# Patient Record
Sex: FEMALE | Race: WHITE | NOT HISPANIC OR LATINO | Employment: OTHER | URBAN - METROPOLITAN AREA
[De-identification: names, ages, dates, MRNs, and addresses within clinical notes are randomized per-mention and may not be internally consistent; named-entity substitution may affect disease eponyms.]

---

## 2017-08-15 ENCOUNTER — OFFICE VISIT (OUTPATIENT)
Dept: ORTHOPEDIC SURGERY | Facility: CLINIC | Age: 52
End: 2017-08-15

## 2017-08-15 VITALS — WEIGHT: 221 LBS | BODY MASS INDEX: 34.69 KG/M2 | HEIGHT: 67 IN | TEMPERATURE: 97.6 F

## 2017-08-15 DIAGNOSIS — M51.36 LUMBAR DEGENERATIVE DISC DISEASE: ICD-10-CM

## 2017-08-15 DIAGNOSIS — M22.40 CHONDROMALACIA PATELLAE SYNDROME, UNSPECIFIED LATERALITY: ICD-10-CM

## 2017-08-15 DIAGNOSIS — M25.561 CHRONIC PAIN OF BOTH KNEES: Primary | ICD-10-CM

## 2017-08-15 DIAGNOSIS — G89.29 CHRONIC PAIN OF BOTH KNEES: Primary | ICD-10-CM

## 2017-08-15 DIAGNOSIS — M25.562 CHRONIC PAIN OF BOTH KNEES: Primary | ICD-10-CM

## 2017-08-15 PROCEDURE — 73562 X-RAY EXAM OF KNEE 3: CPT | Performed by: ORTHOPAEDIC SURGERY

## 2017-08-15 PROCEDURE — 20610 DRAIN/INJ JOINT/BURSA W/O US: CPT | Performed by: ORTHOPAEDIC SURGERY

## 2017-08-15 PROCEDURE — 99203 OFFICE O/P NEW LOW 30 MIN: CPT | Performed by: ORTHOPAEDIC SURGERY

## 2017-08-15 RX ORDER — TRAZODONE HYDROCHLORIDE 100 MG/1
200 TABLET ORAL AS NEEDED
COMMUNITY
Start: 2015-02-19

## 2017-08-15 RX ORDER — ATORVASTATIN CALCIUM 40 MG/1
40 TABLET, FILM COATED ORAL DAILY
COMMUNITY
Start: 2017-04-01

## 2017-08-15 RX ORDER — SUMATRIPTAN 100 MG/1
100 TABLET, FILM COATED ORAL AS NEEDED
COMMUNITY
Start: 2015-08-25

## 2017-08-15 RX ORDER — LISINOPRIL 20 MG/1
20 TABLET ORAL DAILY
COMMUNITY
Start: 2016-03-21

## 2017-08-15 RX ORDER — BUPIVACAINE HYDROCHLORIDE 5 MG/ML
2 INJECTION, SOLUTION PERINEURAL
Status: COMPLETED | OUTPATIENT
Start: 2017-08-15 | End: 2017-08-15

## 2017-08-15 RX ORDER — FLUTICASONE PROPIONATE 50 MCG
2 SPRAY, SUSPENSION (ML) NASAL AS NEEDED
COMMUNITY
Start: 2016-12-21

## 2017-08-15 RX ORDER — LIDOCAINE HYDROCHLORIDE 10 MG/ML
2 INJECTION, SOLUTION INFILTRATION; PERINEURAL
Status: COMPLETED | OUTPATIENT
Start: 2017-08-15 | End: 2017-08-15

## 2017-08-15 RX ORDER — CYCLOBENZAPRINE HCL 10 MG
10 TABLET ORAL 3 TIMES DAILY PRN
COMMUNITY
Start: 2015-02-17

## 2017-08-15 RX ORDER — METHYLPREDNISOLONE ACETATE 80 MG/ML
80 INJECTION, SUSPENSION INTRA-ARTICULAR; INTRALESIONAL; INTRAMUSCULAR; SOFT TISSUE
Status: COMPLETED | OUTPATIENT
Start: 2017-08-15 | End: 2017-08-15

## 2017-08-15 RX ORDER — OXYCODONE AND ACETAMINOPHEN 10; 325 MG/1; MG/1
1 TABLET ORAL EVERY 4 HOURS PRN
COMMUNITY
Start: 2016-11-13 | End: 2018-05-21 | Stop reason: HOSPADM

## 2017-08-15 RX ORDER — ALBUTEROL SULFATE 90 UG/1
1-2 AEROSOL, METERED RESPIRATORY (INHALATION) EVERY 4 HOURS PRN
COMMUNITY
Start: 2014-04-14

## 2017-08-15 RX ORDER — CETIRIZINE HYDROCHLORIDE 10 MG/1
10 TABLET ORAL DAILY
COMMUNITY
Start: 2012-09-26

## 2017-08-15 RX ORDER — POTASSIUM CHLORIDE 750 MG/1
10 TABLET, EXTENDED RELEASE ORAL 2 TIMES DAILY
COMMUNITY
Start: 2015-03-03

## 2017-08-15 RX ORDER — LEVOTHYROXINE SODIUM 0.07 MG/1
75 TABLET ORAL DAILY
COMMUNITY
Start: 2017-08-03

## 2017-08-15 RX ORDER — CLONAZEPAM 1 MG/1
1 TABLET ORAL 2 TIMES DAILY PRN
COMMUNITY
Start: 2015-06-18

## 2017-08-15 RX ORDER — ESTRADIOL 1 MG/1
TABLET ORAL
COMMUNITY
Start: 2017-07-24 | End: 2018-05-16

## 2017-08-15 RX ORDER — FUROSEMIDE 20 MG/1
TABLET ORAL
COMMUNITY
Start: 2017-07-31 | End: 2018-05-16

## 2017-08-15 RX ORDER — OMEPRAZOLE 40 MG/1
40 CAPSULE, DELAYED RELEASE ORAL DAILY
COMMUNITY
Start: 2016-09-21

## 2017-08-15 RX ADMIN — METHYLPREDNISOLONE ACETATE 80 MG: 80 INJECTION, SUSPENSION INTRA-ARTICULAR; INTRALESIONAL; INTRAMUSCULAR; SOFT TISSUE at 15:22

## 2017-08-15 RX ADMIN — BUPIVACAINE HYDROCHLORIDE 2 ML: 5 INJECTION, SOLUTION PERINEURAL at 15:22

## 2017-08-15 RX ADMIN — LIDOCAINE HYDROCHLORIDE 2 ML: 10 INJECTION, SOLUTION INFILTRATION; PERINEURAL at 15:22

## 2017-08-15 NOTE — PROGRESS NOTES
"Patient Name: No Lambert   YOB: 1965  Referring Primary Care Physician: Molly Hood MD      Chief Complaint:    Chief Complaint   Patient presents with   • Left Knee - Establish Care   • Right Knee - Establish Care        Subjective:    HPI:   No Lambert is a pleasant 52 y.o. year old who presents today for evaluation of   Chief Complaint   Patient presents with   • Left Knee - Establish Care   • Right Knee - Establish Care   chronic vinnie knee pain achy, atraumatic and worse over last 6 months.  Saw dr pomeroys assist who injected with cortisone and visco and didn't help.  Says they were talking about a \"knee replacement\".. Constant.  A  CANT TAKE NSAIDS.  NOTHING HELPS.  NO pt    This problem is new to this examiner.     Medications:   Home Medications:  No current outpatient prescriptions on file prior to visit.     No current facility-administered medications on file prior to visit.      Current Medications:  Scheduled Meds:  Continuous Infusions:  No current facility-administered medications for this visit.   PRN Meds:.    I have reviewed the patient's medical history in detail and updated the computerized patient record.  Review and summarization of old records includes:    Past Medical History:   Diagnosis Date   • Acid reflux    • Anxiety    • Borderline diabetes    • Fracture, tibia and fibula    • Hypothyroidism    • Irregular heartbeat    • Migraines    • Stress fracture         Past Surgical History:   Procedure Laterality Date   • BACK SURGERY     • EXPLORATORY LAPAROTOMY     • HYSTERECTOMY     • NECK SURGERY     • TONSILLECTOMY     • TUBAL ABDOMINAL LIGATION          Social History     Occupational History   • Not on file.     Social History Main Topics   • Smoking status: Never Smoker   • Smokeless tobacco: Not on file   • Alcohol use No   • Drug use: No   • Sexual activity: Defer    Social History     Social History Narrative   • No narrative on file        Family History "   Problem Relation Age of Onset   • Hypertension Mother    • Heart failure Father    • Hypertension Sister    • Hypertension Brother        ROS: 14 point review of systems was performed and all other systems were reviewed and are negative except for documented findings in HPI and today's encounter.     Allergies:   Allergies   Allergen Reactions   • Ibuprofen Swelling   • Naproxen Swelling   • Oxycodone-Acetaminophen Rash   • Sulfa Antibiotics Swelling   • Cephalosporins      Pruritic rash   • Ciprofloxacin      Reaction at hospital reported by patient     Constitutional:  Denies fever, shaking or chills   Eyes:  Denies change in visual acuity   HENT:  Denies nasal congestion or sore throat   Respiratory:  Denies cough or shortness of breath   Cardiovascular:  Denies chest pain or severe LE edema   GI:  Denies abdominal pain, nausea, vomiting, bloody stools or diarrhea   Musculoskeletal:  Numbness, tingling, pain, or loss of motor function only as noted above in history of present illness.  : Denies painful urination or hematuria  Integument:  Denies rash, lesion or ulceration   Neurologic:  Denies headache or focal weakness  Endocrine:  Denies lymphadenopathy  Psych:  Denies confusion or change in mental status   Hem:  Denies active bleeding    Objective:    Physical Exam: 52 y.o. female  Body mass index is 34.61 kg/(m^2)., @WT@, @HT@  Vitals:    08/15/17 1437   Temp: 97.6 °F (36.4 °C)     Vital signs reviewed.   General Appearance:    Alert, cooperative, in no acute distress                  Eyes: conjunctiva clear  ENT: external ears and nose atraumatic  CV: no peripheral edema  Resp: normal respiratory effort  Skin: no rashes or wounds; normal turgor  Psych: mood and affect appropriate  Lymph: no nodes appreciated  Neuro: gross sensation intact  Vascular:  Palpable peripheral pulse in noted extremity  Musculoskeletal Extremities: KNEE Exam: medial joint line tenderness with crepitation, synovitis, swelling,  and joint effusion bilateral knee. BAKERS CYST.  PF CREP.  FOOT DROM ON THE LEFT    Radiology:   Imaging done today and discussed at length with the patient:    Indication: pain related symptoms,  Views: 3V AP, LAT & 40 degree PA bilateral knee(s)   Findings: MOD OA AND CHONDROMALACIA PATELLA BILATERAL  Comparison views: not available  Reviewed records from dr mata and mris of both knees- see scanned reports    Assessment:     ICD-10-CM ICD-9-CM   1. Chronic pain of both knees M25.561 719.46    M25.562 338.29    G89.29    2. Chondromalacia patellae syndrome, unspecified laterality M22.40 717.7   3. Lumbar degenerative disc disease M51.36 722.52        Large Joint Arthrocentesis  Date/Time: 8/15/2017 3:22 PM  Consent given by: patient  Site marked: site marked  Timeout: Immediately prior to procedure a time out was called to verify the correct patient, procedure, equipment, support staff and site/side marked as required   Supporting Documentation  Indications: pain and joint swelling   Procedure Details  Location: knee - R knee  Preparation: Patient was prepped and draped in the usual sterile fashion  Needle size: 22 G  Approach: anterolateral  Medications administered: 2 mL lidocaine 1 %; 80 mg methylPREDNISolone acetate 80 MG/ML; 2 mL bupivacaine  Patient tolerance: patient tolerated the procedure well with no immediate complications    Large Joint Arthrocentesis  Date/Time: 8/15/2017 3:22 PM  Consent given by: patient  Site marked: site marked  Timeout: Immediately prior to procedure a time out was called to verify the correct patient, procedure, equipment, support staff and site/side marked as required   Supporting Documentation  Indications: pain and joint swelling   Procedure Details  Location: knee - L knee  Preparation: Patient was prepped and draped in the usual sterile fashion  Needle size: 22 G  Approach: anterolateral  Medications administered: 2 mL lidocaine 1 %; 80 mg methylPREDNISolone acetate 80  MG/ML; 2 mL bupivacaine  Patient tolerance: patient tolerated the procedure well with no immediate complications             Plan: Biomechanics of pertinent body area discussed.  Risks, benefits, alternatives, comparisons, and complications of accepted medicines, injections, recommendations, surgical procedures, and therapies explained and education provided in laymen's terms. The patient was given the opportunity to ask questions and they were answerved to their satisfaction.   Natural history and expected course of this patient's diagnosis discussed along with evaluation of therapies. Questions answered.  Advice on benefits of, and types of regular/moderate exercise.  Address and update of wt loss, physical exercises, use of assistive devices and medications along with potential or observed side effects.  Lifestyle measures for weight loss with biomechanical explanations for weight loss and how this affects orthopedic condition.  Cortisone Injection for DIAGNOSTIC and THERAPUTIC purposes.  PT referral.  Reviewed medical records from other provider(s).  Compression/elastic brace/support sleeve.   Rest, ice, compression, and elevation (RICE) therapy.  Biomechanics and proper use of cane/ambulatory aids.      8/15/2017

## 2018-05-15 ENCOUNTER — APPOINTMENT (OUTPATIENT)
Dept: PREADMISSION TESTING | Facility: HOSPITAL | Age: 53
End: 2018-05-15

## 2018-05-16 ENCOUNTER — APPOINTMENT (OUTPATIENT)
Dept: PREADMISSION TESTING | Facility: HOSPITAL | Age: 53
End: 2018-05-16

## 2018-05-16 VITALS
TEMPERATURE: 97.2 F | OXYGEN SATURATION: 96 % | HEIGHT: 67 IN | RESPIRATION RATE: 20 BRPM | SYSTOLIC BLOOD PRESSURE: 128 MMHG | BODY MASS INDEX: 36.73 KG/M2 | DIASTOLIC BLOOD PRESSURE: 92 MMHG | HEART RATE: 91 BPM | WEIGHT: 234 LBS

## 2018-05-16 LAB
ANION GAP SERPL CALCULATED.3IONS-SCNC: 13.7 MMOL/L
BUN BLD-MCNC: 15 MG/DL (ref 6–20)
BUN/CREAT SERPL: 15.5 (ref 7–25)
CALCIUM SPEC-SCNC: 8.9 MG/DL (ref 8.6–10.5)
CHLORIDE SERPL-SCNC: 100 MMOL/L (ref 98–107)
CO2 SERPL-SCNC: 25.3 MMOL/L (ref 22–29)
CREAT BLD-MCNC: 0.97 MG/DL (ref 0.57–1)
DEPRECATED RDW RBC AUTO: 50.9 FL (ref 37–54)
ERYTHROCYTE [DISTWIDTH] IN BLOOD BY AUTOMATED COUNT: 14.3 % (ref 11.7–13)
GFR SERPL CREATININE-BSD FRML MDRD: 60 ML/MIN/1.73
GLUCOSE BLD-MCNC: 131 MG/DL (ref 65–99)
HCT VFR BLD AUTO: 40.7 % (ref 35.6–45.5)
HGB BLD-MCNC: 13.1 G/DL (ref 11.9–15.5)
MCH RBC QN AUTO: 31.1 PG (ref 26.9–32)
MCHC RBC AUTO-ENTMCNC: 32.2 G/DL (ref 32.4–36.3)
MCV RBC AUTO: 96.7 FL (ref 80.5–98.2)
PLATELET # BLD AUTO: 235 10*3/MM3 (ref 140–500)
PMV BLD AUTO: 10.2 FL (ref 6–12)
POTASSIUM BLD-SCNC: 4 MMOL/L (ref 3.5–5.2)
RBC # BLD AUTO: 4.21 10*6/MM3 (ref 3.9–5.2)
SODIUM BLD-SCNC: 139 MMOL/L (ref 136–145)
WBC NRBC COR # BLD: 7.28 10*3/MM3 (ref 4.5–10.7)

## 2018-05-16 PROCEDURE — 36415 COLL VENOUS BLD VENIPUNCTURE: CPT

## 2018-05-16 PROCEDURE — 80048 BASIC METABOLIC PNL TOTAL CA: CPT | Performed by: PAIN MEDICINE

## 2018-05-16 PROCEDURE — 85027 COMPLETE CBC AUTOMATED: CPT | Performed by: PAIN MEDICINE

## 2018-05-16 RX ORDER — FUROSEMIDE 20 MG/1
20 TABLET ORAL 2 TIMES DAILY
COMMUNITY

## 2018-05-16 RX ORDER — ESTRADIOL 1 MG/1
1 TABLET ORAL DAILY
COMMUNITY

## 2018-05-16 NOTE — DISCHARGE INSTRUCTIONS
Take the following medications the morning of surgery with a small sip of water:  ALBUTEROL, LEVOTHYROXINE, PERCOCET, CLONAZEPAM    ARRIVE TO MAIN OR AT 1:30 P.M.    General Instructions:  • Do not eat solid food after midnight the night before surgery.  • You may drink clear liquids day of surgery but must stop at least one hour before your hospital arrival time.  • It is beneficial for you to have a clear drink that contains carbohydrates the day of surgery.  We suggest a 12 to 20 ounce bottle of Gatorade or Powerade for non-diabetic patients or a 12 to 20 ounce bottle of G2 or Powerade Zero for diabetic patients. (Pediatric patients, are not advised to drink a 12 to 20 ounce carbohydrate drink)    Clear liquids are liquids you can see through.  Nothing red in color.     Plain water                               Sports drinks  Sodas                                   Gelatin (Jell-O)  Fruit juices without pulp such as white grape juice and apple juice  Popsicles that contain no fruit or yogurt  Tea or coffee (no cream or milk added)  Gatorade / Powerade  G2 / Powerade Zero    • Infants may have breast milk up to four hours before surgery.  • Infants drinking formula may drink formula up to six hours before surgery.   • Patients who avoid smoking, chewing tobacco and alcohol for 4 weeks prior to surgery have a reduced risk of post-operative complications.  Quit smoking as many days before surgery as you can.  • Do not smoke, use chewing tobacco or drink alcohol the day of surgery.   • If applicable bring your C-PAP/ BI-PAP machine.  • Bring any papers given to you in the doctor’s office.  • Wear clean comfortable clothes and socks.  • Do not wear contact lenses or make-up.  Bring a case for your glasses.   • Bring crutches or walker if applicable.  • Remove all piercings.  Leave jewelry and any other valuables at home.  • Hair extensions with metal clips must be removed prior to surgery.  • The Pre-Admission Testing  nurse will instruct you to bring medications if unable to obtain an accurate list in Pre-Admission Testing.          Preventing a Surgical Site Infection:  • For 2 to 3 days before surgery, avoid shaving with a razor because the razor can irritate skin and make it easier to develop an infection.  • The night prior to surgery sleep in a clean bed with clean clothing.  Do not allow pets to sleep with you.  • Shower on the morning of surgery using a fresh bar of anti-bacterial soap (such as Dial) and clean washcloth.  Dry with a clean towel and dress in clean clothing.  • Ask your surgeon if you will be receiving antibiotics prior to surgery.  • Make sure you, your family, and all healthcare providers clean their hands with soap and water or an alcohol based hand  before caring for you or your wound.    Day of surgery:  Upon arrival, a Pre-op nurse and Anesthesiologist will review your health history, obtain vital signs, and answer questions you may have.  The only belongings needed at this time will be your home medications and if applicable your C-PAP/BI-PAP machine.  If you are staying overnight your family can leave the rest of your belongings in the car and bring them to your room later.  A Pre-op nurse will start an IV and you may receive medication in preparation for surgery, including something to help you relax.  Your family will be able to see you in the Pre-op area.  While you are in surgery your family should notify the waiting room  if they leave the waiting room area and provide a contact phone number.    Please be aware that surgery does come with discomfort.  We want to make every effort to control your discomfort so please discuss any uncontrolled symptoms with your nurse.   Your doctor will most likely have prescribed pain medications.      If you are going home after surgery you will receive individualized written care instructions before being discharged.  A responsible adult  must drive you to and from the hospital on the day of your surgery and stay with you for 24 hours.    If you are staying overnight following surgery, you will be transported to your hospital room following the recovery period.  [facility] has all private rooms.    If you have any questions please call Pre-Admission Testing at [phone].  Deductibles and co-payments are collected on the day of service. Please be prepared to pay the required co-pay, deductible or deposit on the day of service as defined by your plan.

## 2018-05-21 ENCOUNTER — ANESTHESIA EVENT (OUTPATIENT)
Dept: PERIOP | Facility: HOSPITAL | Age: 53
End: 2018-05-21

## 2018-05-21 ENCOUNTER — ANESTHESIA (OUTPATIENT)
Dept: PERIOP | Facility: HOSPITAL | Age: 53
End: 2018-05-21

## 2018-05-21 ENCOUNTER — APPOINTMENT (OUTPATIENT)
Dept: GENERAL RADIOLOGY | Facility: HOSPITAL | Age: 53
End: 2018-05-21

## 2018-05-21 ENCOUNTER — HOSPITAL ENCOUNTER (OUTPATIENT)
Facility: HOSPITAL | Age: 53
Setting detail: HOSPITAL OUTPATIENT SURGERY
Discharge: HOME | End: 2018-05-21
Attending: PAIN MEDICINE | Admitting: PAIN MEDICINE

## 2018-05-21 VITALS
OXYGEN SATURATION: 96 % | WEIGHT: 235.01 LBS | HEART RATE: 84 BPM | SYSTOLIC BLOOD PRESSURE: 117 MMHG | BODY MASS INDEX: 36.81 KG/M2 | DIASTOLIC BLOOD PRESSURE: 80 MMHG | TEMPERATURE: 99 F | RESPIRATION RATE: 16 BRPM

## 2018-05-21 PROBLEM — M96.1 POSTLAMINECTOMY SYNDROME OF LUMBOSACRAL REGION: Chronic | Status: ACTIVE | Noted: 2018-05-21

## 2018-05-21 LAB — GLUCOSE BLDC GLUCOMTR-MCNC: 102 MG/DL (ref 70–130)

## 2018-05-21 PROCEDURE — 82962 GLUCOSE BLOOD TEST: CPT

## 2018-05-21 PROCEDURE — 25010000002 PROPOFOL 10 MG/ML EMULSION: Performed by: NURSE ANESTHETIST, CERTIFIED REGISTERED

## 2018-05-21 PROCEDURE — C1772 INFUSION PUMP, PROGRAMMABLE: HCPCS | Performed by: PAIN MEDICINE

## 2018-05-21 PROCEDURE — 25010000002 MIDAZOLAM PER 1 MG: Performed by: ANESTHESIOLOGY

## 2018-05-21 PROCEDURE — 25010000002 FENTANYL CITRATE (PF) 100 MCG/2ML SOLUTION: Performed by: NURSE ANESTHETIST, CERTIFIED REGISTERED

## 2018-05-21 PROCEDURE — 72020 X-RAY EXAM OF SPINE 1 VIEW: CPT

## 2018-05-21 PROCEDURE — 25010000002 VANCOMYCIN PER 500 MG: Performed by: PAIN MEDICINE

## 2018-05-21 PROCEDURE — 25010000002 FENTANYL CITRATE (PF) 100 MCG/2ML SOLUTION: Performed by: ANESTHESIOLOGY

## 2018-05-21 PROCEDURE — 25010000002 HYDROMORPHONE PER 4 MG: Performed by: PAIN MEDICINE

## 2018-05-21 PROCEDURE — 25010000002 ONDANSETRON PER 1 MG: Performed by: NURSE ANESTHETIST, CERTIFIED REGISTERED

## 2018-05-21 PROCEDURE — C1755 CATHETER, INTRASPINAL: HCPCS | Performed by: PAIN MEDICINE

## 2018-05-21 PROCEDURE — 76000 FLUOROSCOPY <1 HR PHYS/QHP: CPT

## 2018-05-21 PROCEDURE — 25010000002 DEXAMETHASONE PER 1 MG: Performed by: NURSE ANESTHETIST, CERTIFIED REGISTERED

## 2018-05-21 DEVICE — THE INTRATHECAL CATHETER IS A STERILE, SINGLE-PIECE, RADIOPAQUE, SILICONE CATHETER WITH A PRE-INSERTED HYDROPHILIC STIFFENING STYLET THAT IS USED TO ASSIST IN PLACING THE CATHETER.
Type: IMPLANTABLE DEVICE | Site: BACK | Status: FUNCTIONAL
Brand: PROMETRA INTRATHECAL CATHETER

## 2018-05-21 DEVICE — THE PROMETRA II PROGRAMMABLE PUMP IS A STERILE, BATTERY-OPERATED, TEARDROP-SHAPED IMPLANTABLE, PROGRAMMABLE INFUSION PUMP, WITH A RIGID TITANIUM HOUSING AND FLOW CONTROLLER SYSTEM, WHICH DISPENSES INFUSATE INTO THE INTRATHECAL SPACE THROUGH AN IMPLANTED INTRATHECAL CATHETER. TO HELP INCREASE SAFETY, THE PROMETRA II PUMP INCORPORATES A SAFETY VALVE (FLOW-ACTIVATED VALVE OR FAV) THAT WILL SHUT OFF DRUG FLOW TO THE PATIENT IN THE EVENT A HIGH FLOW RATE OCCURS, SUCH AS DURING AN MRI. ALL FUNCTIONS OF THE SYSTEM (E.G., DOSING) ARE CONTROLLED EXTERNALLY USING A HAND-HELD, BATTERY-OPERATED PROGRAMMER. THE PROMETRA II PUMP CONTAINS A METAL BELLOWS DRUG RESERVOIR WITH A CAPACITY OF 20 MILLILITERS (ML). THE RESERVOIR PROPELLANT IS STORED WITHIN THE RIGID HOUSING SURROUNDING THE BELLOWS AND PROVIDES THE DRIVING PRESSURE FOR THE PUMP. THE DRIVING PRESSURE ON THE RESERVOIR FORCES THE INFUSATE THROUGH AN OUTLET FILTER (0.22 PM), AND INTO AN ELECTRONICALLY CONTROLLED FLOW METERING VALVE-ACCUMULATOR SUBSYSTEM. THE INFUMORPH PASSES FROM THE FLOW METERING SUBSYSTEM, INTO THE CATHETER ACCESS PORT THEN INTO THE CATHETER FOR DELIVERY TO THE INTRATHECAL SPACE.
Type: IMPLANTABLE DEVICE | Site: BACK | Status: FUNCTIONAL
Brand: PROMETRA II PUMP

## 2018-05-21 RX ORDER — ONDANSETRON 2 MG/ML
INJECTION INTRAMUSCULAR; INTRAVENOUS AS NEEDED
Status: DISCONTINUED | OUTPATIENT
Start: 2018-05-21 | End: 2018-05-21 | Stop reason: SURG

## 2018-05-21 RX ORDER — MIDAZOLAM HYDROCHLORIDE 1 MG/ML
1 INJECTION INTRAMUSCULAR; INTRAVENOUS
Status: DISCONTINUED | OUTPATIENT
Start: 2018-05-21 | End: 2018-05-21 | Stop reason: HOSPADM

## 2018-05-21 RX ORDER — OXYCODONE AND ACETAMINOPHEN 7.5; 325 MG/1; MG/1
1 TABLET ORAL ONCE AS NEEDED
Status: DISCONTINUED | OUTPATIENT
Start: 2018-05-21 | End: 2018-05-21 | Stop reason: HOSPADM

## 2018-05-21 RX ORDER — LIDOCAINE HYDROCHLORIDE 20 MG/ML
INJECTION, SOLUTION INFILTRATION; PERINEURAL AS NEEDED
Status: DISCONTINUED | OUTPATIENT
Start: 2018-05-21 | End: 2018-05-21 | Stop reason: SURG

## 2018-05-21 RX ORDER — CLINDAMYCIN PHOSPHATE 600 MG/50ML
600 INJECTION INTRAVENOUS
Status: COMPLETED | OUTPATIENT
Start: 2018-05-21 | End: 2018-05-21

## 2018-05-21 RX ORDER — MIDAZOLAM HYDROCHLORIDE 1 MG/ML
2 INJECTION INTRAMUSCULAR; INTRAVENOUS
Status: DISCONTINUED | OUTPATIENT
Start: 2018-05-21 | End: 2018-05-21 | Stop reason: HOSPADM

## 2018-05-21 RX ORDER — PROMETHAZINE HYDROCHLORIDE 25 MG/ML
12.5 INJECTION, SOLUTION INTRAMUSCULAR; INTRAVENOUS ONCE AS NEEDED
Status: DISCONTINUED | OUTPATIENT
Start: 2018-05-21 | End: 2018-05-21 | Stop reason: HOSPADM

## 2018-05-21 RX ORDER — LABETALOL HYDROCHLORIDE 5 MG/ML
5 INJECTION, SOLUTION INTRAVENOUS
Status: DISCONTINUED | OUTPATIENT
Start: 2018-05-21 | End: 2018-05-21 | Stop reason: HOSPADM

## 2018-05-21 RX ORDER — SODIUM CHLORIDE 0.9 % (FLUSH) 0.9 %
1-10 SYRINGE (ML) INJECTION AS NEEDED
Status: DISCONTINUED | OUTPATIENT
Start: 2018-05-21 | End: 2018-05-21 | Stop reason: HOSPADM

## 2018-05-21 RX ORDER — DIPHENHYDRAMINE HYDROCHLORIDE 50 MG/ML
12.5 INJECTION INTRAMUSCULAR; INTRAVENOUS
Status: DISCONTINUED | OUTPATIENT
Start: 2018-05-21 | End: 2018-05-21 | Stop reason: HOSPADM

## 2018-05-21 RX ORDER — ROCURONIUM BROMIDE 10 MG/ML
INJECTION, SOLUTION INTRAVENOUS AS NEEDED
Status: DISCONTINUED | OUTPATIENT
Start: 2018-05-21 | End: 2018-05-21 | Stop reason: SURG

## 2018-05-21 RX ORDER — ONDANSETRON 2 MG/ML
4 INJECTION INTRAMUSCULAR; INTRAVENOUS ONCE AS NEEDED
Status: DISCONTINUED | OUTPATIENT
Start: 2018-05-21 | End: 2018-05-21 | Stop reason: HOSPADM

## 2018-05-21 RX ORDER — NALOXONE HCL 0.4 MG/ML
0.2 VIAL (ML) INJECTION AS NEEDED
Status: DISCONTINUED | OUTPATIENT
Start: 2018-05-21 | End: 2018-05-21 | Stop reason: HOSPADM

## 2018-05-21 RX ORDER — SODIUM CHLORIDE 9 MG/ML
INJECTION, SOLUTION INTRAVENOUS AS NEEDED
Status: DISCONTINUED | OUTPATIENT
Start: 2018-05-21 | End: 2018-05-21 | Stop reason: HOSPADM

## 2018-05-21 RX ORDER — EPHEDRINE SULFATE 50 MG/ML
5 INJECTION, SOLUTION INTRAVENOUS ONCE AS NEEDED
Status: DISCONTINUED | OUTPATIENT
Start: 2018-05-21 | End: 2018-05-21 | Stop reason: HOSPADM

## 2018-05-21 RX ORDER — DEXAMETHASONE SODIUM PHOSPHATE 10 MG/ML
INJECTION INTRAMUSCULAR; INTRAVENOUS AS NEEDED
Status: DISCONTINUED | OUTPATIENT
Start: 2018-05-21 | End: 2018-05-21 | Stop reason: SURG

## 2018-05-21 RX ORDER — FAMOTIDINE 10 MG/ML
20 INJECTION, SOLUTION INTRAVENOUS ONCE
Status: COMPLETED | OUTPATIENT
Start: 2018-05-21 | End: 2018-05-21

## 2018-05-21 RX ORDER — PROPOFOL 10 MG/ML
VIAL (ML) INTRAVENOUS AS NEEDED
Status: DISCONTINUED | OUTPATIENT
Start: 2018-05-21 | End: 2018-05-21 | Stop reason: SURG

## 2018-05-21 RX ORDER — FENTANYL CITRATE 50 UG/ML
50 INJECTION, SOLUTION INTRAMUSCULAR; INTRAVENOUS
Status: DISCONTINUED | OUTPATIENT
Start: 2018-05-21 | End: 2018-05-21 | Stop reason: HOSPADM

## 2018-05-21 RX ORDER — OXYCODONE HYDROCHLORIDE AND ACETAMINOPHEN 5; 325 MG/1; MG/1
1 TABLET ORAL ONCE AS NEEDED
Status: DISCONTINUED | OUTPATIENT
Start: 2018-05-21 | End: 2018-05-21 | Stop reason: HOSPADM

## 2018-05-21 RX ORDER — HYDROMORPHONE HYDROCHLORIDE 1 MG/ML
0.5 INJECTION, SOLUTION INTRAMUSCULAR; INTRAVENOUS; SUBCUTANEOUS
Status: DISCONTINUED | OUTPATIENT
Start: 2018-05-21 | End: 2018-05-21 | Stop reason: HOSPADM

## 2018-05-21 RX ORDER — PROMETHAZINE HYDROCHLORIDE 25 MG/1
25 TABLET ORAL ONCE AS NEEDED
Status: DISCONTINUED | OUTPATIENT
Start: 2018-05-21 | End: 2018-05-21 | Stop reason: HOSPADM

## 2018-05-21 RX ORDER — HYDROMORPHONE HCL 110MG/55ML
PATIENT CONTROLLED ANALGESIA SYRINGE INTRAVENOUS AS NEEDED
Status: DISCONTINUED | OUTPATIENT
Start: 2018-05-21 | End: 2018-05-21 | Stop reason: HOSPADM

## 2018-05-21 RX ORDER — FLUMAZENIL 0.1 MG/ML
0.2 INJECTION INTRAVENOUS AS NEEDED
Status: DISCONTINUED | OUTPATIENT
Start: 2018-05-21 | End: 2018-05-21 | Stop reason: HOSPADM

## 2018-05-21 RX ORDER — BUPIVACAINE HYDROCHLORIDE AND EPINEPHRINE 5; 5 MG/ML; UG/ML
INJECTION, SOLUTION PERINEURAL AS NEEDED
Status: DISCONTINUED | OUTPATIENT
Start: 2018-05-21 | End: 2018-05-21 | Stop reason: HOSPADM

## 2018-05-21 RX ORDER — CLINDAMYCIN HYDROCHLORIDE 300 MG/1
300 CAPSULE ORAL 3 TIMES DAILY
Qty: 30 CAPSULE | Refills: 0 | Status: SHIPPED | OUTPATIENT
Start: 2018-05-21 | End: 2018-05-28

## 2018-05-21 RX ORDER — HYDROCODONE BITARTRATE AND ACETAMINOPHEN 7.5; 325 MG/1; MG/1
1 TABLET ORAL ONCE AS NEEDED
Status: COMPLETED | OUTPATIENT
Start: 2018-05-21 | End: 2018-05-21

## 2018-05-21 RX ORDER — PROMETHAZINE HYDROCHLORIDE 25 MG/1
12.5 TABLET ORAL ONCE AS NEEDED
Status: DISCONTINUED | OUTPATIENT
Start: 2018-05-21 | End: 2018-05-21 | Stop reason: HOSPADM

## 2018-05-21 RX ORDER — HYDRALAZINE HYDROCHLORIDE 20 MG/ML
5 INJECTION INTRAMUSCULAR; INTRAVENOUS
Status: DISCONTINUED | OUTPATIENT
Start: 2018-05-21 | End: 2018-05-21 | Stop reason: HOSPADM

## 2018-05-21 RX ORDER — SODIUM CHLORIDE, SODIUM LACTATE, POTASSIUM CHLORIDE, CALCIUM CHLORIDE 600; 310; 30; 20 MG/100ML; MG/100ML; MG/100ML; MG/100ML
9 INJECTION, SOLUTION INTRAVENOUS CONTINUOUS
Status: DISCONTINUED | OUTPATIENT
Start: 2018-05-21 | End: 2018-05-21 | Stop reason: HOSPADM

## 2018-05-21 RX ORDER — LIDOCAINE HYDROCHLORIDE 10 MG/ML
0.5 INJECTION, SOLUTION EPIDURAL; INFILTRATION; INTRACAUDAL; PERINEURAL ONCE AS NEEDED
Status: DISCONTINUED | OUTPATIENT
Start: 2018-05-21 | End: 2018-05-21 | Stop reason: HOSPADM

## 2018-05-21 RX ORDER — OXYCODONE HYDROCHLORIDE AND ACETAMINOPHEN 5; 325 MG/1; MG/1
1-2 TABLET ORAL EVERY 4 HOURS PRN
Qty: 30 TABLET | Refills: 0 | Status: SHIPPED | OUTPATIENT
Start: 2018-05-21

## 2018-05-21 RX ORDER — PROMETHAZINE HYDROCHLORIDE 25 MG/1
25 SUPPOSITORY RECTAL ONCE AS NEEDED
Status: DISCONTINUED | OUTPATIENT
Start: 2018-05-21 | End: 2018-05-21 | Stop reason: HOSPADM

## 2018-05-21 RX ADMIN — SODIUM CHLORIDE, POTASSIUM CHLORIDE, SODIUM LACTATE AND CALCIUM CHLORIDE 9 ML/HR: 600; 310; 30; 20 INJECTION, SOLUTION INTRAVENOUS at 13:52

## 2018-05-21 RX ADMIN — CLINDAMYCIN PHOSPHATE 600 MG: 12 INJECTION, SOLUTION INTRAVENOUS at 15:24

## 2018-05-21 RX ADMIN — FENTANYL CITRATE 50 MCG: 50 INJECTION INTRAMUSCULAR; INTRAVENOUS at 16:33

## 2018-05-21 RX ADMIN — FENTANYL CITRATE 50 MCG: 50 INJECTION INTRAMUSCULAR; INTRAVENOUS at 15:45

## 2018-05-21 RX ADMIN — LIDOCAINE HYDROCHLORIDE 60 MG: 20 INJECTION, SOLUTION INFILTRATION; PERINEURAL at 15:21

## 2018-05-21 RX ADMIN — FENTANYL CITRATE 100 MCG: 50 INJECTION INTRAMUSCULAR; INTRAVENOUS at 15:21

## 2018-05-21 RX ADMIN — FAMOTIDINE 20 MG: 10 INJECTION INTRAVENOUS at 13:52

## 2018-05-21 RX ADMIN — HYDROCODONE BITARTRATE AND ACETAMINOPHEN 1 TABLET: 7.5; 325 TABLET ORAL at 17:23

## 2018-05-21 RX ADMIN — FENTANYL CITRATE 50 MCG: 50 INJECTION INTRAMUSCULAR; INTRAVENOUS at 17:17

## 2018-05-21 RX ADMIN — PROPOFOL 200 MG: 10 INJECTION, EMULSION INTRAVENOUS at 15:21

## 2018-05-21 RX ADMIN — FENTANYL CITRATE 50 MCG: 50 INJECTION INTRAMUSCULAR; INTRAVENOUS at 13:52

## 2018-05-21 RX ADMIN — ROCURONIUM BROMIDE 40 MG: 10 INJECTION INTRAVENOUS at 15:21

## 2018-05-21 RX ADMIN — DEXAMETHASONE SODIUM PHOSPHATE 8 MG: 10 INJECTION INTRAMUSCULAR; INTRAVENOUS at 15:30

## 2018-05-21 RX ADMIN — SUGAMMADEX 425 MG: 100 INJECTION, SOLUTION INTRAVENOUS at 16:20

## 2018-05-21 RX ADMIN — MIDAZOLAM 1 MG: 1 INJECTION INTRAMUSCULAR; INTRAVENOUS at 13:52

## 2018-05-21 RX ADMIN — ONDANSETRON 4 MG: 2 INJECTION INTRAMUSCULAR; INTRAVENOUS at 15:56

## 2018-05-21 RX ADMIN — FENTANYL CITRATE 50 MCG: 50 INJECTION INTRAMUSCULAR; INTRAVENOUS at 16:59

## 2018-05-21 RX ADMIN — FENTANYL CITRATE 50 MCG: 50 INJECTION INTRAMUSCULAR; INTRAVENOUS at 16:37

## 2018-05-21 NOTE — ANESTHESIA POSTPROCEDURE EVALUATION
Patient: No Lambert    Procedure Summary     Date:  05/21/18 Room / Location:  [facility]    Anesthesia Start:  1518 Anesthesia Stop:  1639    Procedure:  FLOWONIX INTRATHECAL PUMP IMPLANT (N/A ) Diagnosis:      Surgeon:  Toni Mccormack MD Provider:  Mercedes Zaragoza MD    Anesthesia Type:  general ASA Status:  3          Anesthesia Type: general  Last vitals  BP   124/84 (05/21/18 1909)   Temp   37.2 °C (99 °F) (05/21/18 1845)   Pulse   84 (05/21/18 1909)   Resp   16 (05/21/18 1909)     SpO2   94 % (05/21/18 1909)     Post Anesthesia Care and Evaluation    Patient location during evaluation: bedside  Patient participation: complete - patient participated  Level of consciousness: awake  Pain management: adequate  Airway patency: patent  Anesthetic complications: No anesthetic complications    Cardiovascular status: acceptable  Respiratory status: acceptable  Hydration status: acceptable    Comments: */84   Pulse 84   Temp 37.2 °C (99 °F) (Oral)   Resp 16   Wt 107 kg (235 lb 0.2 oz)   SpO2 94%   BMI 36.81 kg/m²

## 2018-05-21 NOTE — H&P
Patient Care Team:  Molly Hood MD as PCP - General  Molly Hood MD as PCP - Family Medicine    Chief complaint back and leg pain    Subjective     Patient is a 53 y.o. female with chronic low back and leg pain from DDD-lumbar. She has had prior back surgery with no long term help. She has undergone intrathecal opioid trial with at least 50% reduction in pain and improved activity. She is here for implantation of the permanent system.     Review of Systems   Pertinent items are noted in HPI    History  Past Medical History:   Diagnosis Date   • Acid reflux    • Anxiety    • Back pain    • Borderline diabetes    • Bursitis    • Colitis    • Colon polyps    • COPD (chronic obstructive pulmonary disease)    • Fracture, tibia and fibula    • Hypothyroidism    • IBS (irritable bowel syndrome)    • Irregular heartbeat    • Left foot drop    • Migraines    • Nerve damage     DO NOT LAY ON LEFT SIDE   • Panic attack    • Stress fracture      Past Surgical History:   Procedure Laterality Date   • BACK SURGERY     • COLONOSCOPY     • EXPLORATORY LAPAROTOMY      PARTIAL BLADDER    • HYSTERECTOMY     • NECK SURGERY     • TONSILLECTOMY     • TUBAL ABDOMINAL LIGATION       Social History     Social History   • Marital status: Single     Spouse name: N/A   • Number of children: N/A   • Years of education: N/A     Occupational History   • Not on file.     Social History Main Topics   • Smoking status: Never Smoker   • Smokeless tobacco: Never Used   • Alcohol use No   • Drug use: No   • Sexual activity: Defer     Other Topics Concern   • Not on file     Social History Narrative   • No narrative on file     Family History   Problem Relation Age of Onset   • Hypertension Mother    • Heart failure Father    • Hypertension Sister    • Hypertension Brother    • Malig Hyperthermia Neg Hx      Allergies   Allergen Reactions   • Morphine And Related Nausea And Vomiting   • Ibuprofen Swelling   • Naproxen Swelling   •  Sulfa Antibiotics Swelling   • Cephalosporins      Pruritic rash   • Ciprofloxacin      Reaction at hospital reported by patient       Objective     Vital Signs  Temp:  [98.4 °F (36.9 °C)] 98.4 °F (36.9 °C)  Heart Rate:  [76-85] 76  Resp:  [16] 16  BP: (122)/(80) 122/80    Physical Exam:    Mildly obese WF. NAD. HEENT-WNL. Cardiopulmonary-WNL. Neuro-intact. Scar-lumbar spine    Results Review:    I reviewed the patient's new clinical results.    Assessment/Plan     Active Problems:    * No active hospital problems. *      A:  1. PLS-lumbar  2. Chronic back and leg pain    P: Implant pain pump and spinal catheter.     I discussed the patients findings and my recommendations with patient.     Toni Mccormack MD  05/21/18  3:05 PM    Time: More than 50% of time spent in counseling and coordination of care:  Total face-to-face/floor time 15 min.  Time spent in counseling 10 min. Counseling included the following topics: risks and benefits of surgery

## 2018-05-21 NOTE — DISCHARGE INSTRUCTIONS
Outpatient Surgery Guidelines, Adult  Outpatient procedures are those for which the person having the procedure is allowed to go home the same day as the procedure. Various procedures are done on an outpatient basis. You should follow some general guidelines if you will be having an outpatient procedure.  AFTER THE  PROCEDURE  After surgery, you will be taken to a recovery area, where your progress will be monitored. If there are no complications, you will be allowed to go home when you are awake, stable, and taking fluids well. You may have numbness around the surgical site. Healing will take some time. You will have tenderness at the surgical site and may have some swelling and bruising. You may also have some nausea.  HOME CARE INSTRUCTIONS  · Do not drive for 24 hours, or as directed by your health care provider. Do not drive while taking prescription pain medicines.  · Do not drink alcohol for 24 hours.  · Do not make important decisions or sign legal documents for 24 hours.  · Plan on having a responsible adult stay with your for 24 hours following your procedure.  · You may resume a normal diet and activities as directed.  · Do not lift anything heavier than 10 pounds (4.5 kg) or play contact sports until your health care provider says it is okay.  · Only take over-the-counter or prescription medicines as directed by your health care provider.  · Follow up with your health care provider as directed.  SEEK MEDICAL CARE IF:  · You have increased bleeding (more than a small spot) from the surgical site.  · You have redness, swelling, or increasing pain in the wound.  · You see pus coming from the wound.  · You have a fever > 101.  · You notice a bad smell coming from the wound or dressing.  · You feel lightheaded or faint.  · You develop a rash.  · You have trouble breathing.  · You develop allergies.  MAKE SURE YOU:  · Understand these instructions.  · Will watch your condition.  Will get help right away if you  are not doing well or get worse.Outpatient Surgery Guidelines, Adult  Outpatient procedures are those for which the person having the procedure is allowed to go home the same day as the procedure. Various procedures are done on an outpatient basis. You should follow some general guidelines if you will be having an outpatient procedure.  AFTER THE  PROCEDURE  After surgery, you will be taken to a recovery area, where your progress will be monitored. If there are no complications, you will be allowed to go home when you are awake, stable, and taking fluids well. You may have numbness around the surgical site. Healing will take some time. You will have tenderness at the surgical site and may have some swelling and bruising. You may also have some nausea.  HOME CARE INSTRUCTIONS  Do not drive for 24 hours, or as directed by your health care provider. Do not drive while taking prescription pain medicines.  Do not drink alcohol for 24 hours.  Do not make important decisions or sign legal documents for 24 hours.  Plan on having a responsible adult stay with your for 24 hours following your procedure.  You may resume a normal diet and activities as directed.  Do not lift anything heavier than 10 pounds (4.5 kg) or play contact sports until your health care provider says it is okay.  Only take over-the-counter or prescription medicines as directed by your health care provider.  Follow up with your health care provider as directed.  SEEK MEDICAL CARE IF:  You have increased bleeding (more than a small spot) from the surgical site.  You have redness, swelling, or increasing pain in the wound.  You see pus coming from the wound.  You have a fever > 101.  You notice a bad smell coming from the wound or dressing.  You feel lightheaded or faint.  You develop a rash.  You have trouble breathing.  You develop allergies.  MAKE SURE YOU:  Understand these instructions.  Will watch your condition.  · Will get help right away if you are  not doing well or get worse.

## 2018-05-21 NOTE — ANESTHESIA PREPROCEDURE EVALUATION
Anesthesia Evaluation     Patient summary reviewed and Nursing notes reviewed                Airway   Mallampati: II  Dental      Pulmonary - normal exam    breath sounds clear to auscultation  (+) COPD,   Cardiovascular - normal exam    (+) hypertension,       Neuro/Psych  (+) psychiatric history Anxiety,     GI/Hepatic/Renal/Endo    (+)  GERD,  diabetes mellitus type 2, hypothyroidism,     Musculoskeletal     (+) back pain,   Abdominal    Substance History - negative use     OB/GYN          Other   (+) arthritis                     Anesthesia Plan    ASA 3     general     intravenous induction   Anesthetic plan and risks discussed with patient.

## 2018-05-21 NOTE — OP NOTE
Pre-op diagnosis:  1.  Postlaminectomy syndrome lumbar spine  2.  Chronic low back and leg pain    Postop diagnosis-same    Procedure:  1.  Implantation of intrathecal catheter  2.  Implantation of programmable infusion pump    Gen. endotracheal anesthesia supplemented with 20 cc 0.5% bupivacaine with epinephrine    Blood loss-minimal    Competitions-none    Statement of medical necessity-patient is a 53-year-old white female with refractory back and leg pain despite prior lumbar spine surgery.  She is failed conservative therapy.  She is failed injection therapy.  She has undergone successful intrathecal opioid trial with hydromorphone at 1.2 mg per day with at least 50% reduction in pain and improved activity.  She is here for implantation of permanent system.  She is undergone successful psychological screening.  She is also undergone education.  There is no sepsis, coagulopathy, nor known obstructions in the intrathecal space that preclude placement of the system.  Risks and benefits of the procedure have been explained in detail to the patient and she wishes to proceed.  Risks include, but are not limited to:  1.  Postoperative bleeding  2.  Postoperative infection  3.  Postoperative CSF leak  4.  Postoperative or intraoperative spinal cord damage    Patient was taken the operating room.  After induction of satisfactory anesthesia, she was carefully rolled into the prone position with pillows under her abdomen and all pressure points appropriate padded and protected.  The skin over the thoracolumbar spine and hips was cleaned, prepped and draped using sterile technique.  Using C-arm, identified the L2-L3 interspace.  I anesthetized target skin and made an incision 5 cm longitudinally along the axis of the lumbar spine and dissected down with electrocautery until identified the supraspinous ligament.  Using a left paramedian approach, inserted a 14-gauge spinal needle and advanced implant CSF flow from the  needle hub.  I threaded an intrathecal catheter until the catheter tip was the top of T9.  At no point during advancement catheter did I see any involuntary leg movement.  I remove the introducer needle and also the stylus within the catheter and had CSF flow from the catheter tip.  I then secured the catheter to the underlying fascia with 2 butterfly anchors placed 2 cm apart for a strain relief loop.  I then packed this incision with a Ray-Moira soaked in bacitracin.    I identified the implant site the pump with the patient's left hip.  I measured 2.5 centimeters below the iliac crest and anesthetized target skin.  I made an incision 10 cm in length and dissected down until I found a natural tissue plane at 2 cm in depth.  This was enlarged using blunt dissection.  I then packed this incision with a lap sponge soaked in bacitracin.  I tunneled the catheter from the lumbar incision to the pocket site.  Excess catheter was trimmed and passed off the field.  The pump, which had been prepared in the back table, was filled with preservative-free hydromorphone and then passed on the field.  I then connected the catheter to the side discharge port of the pump and protected this connection with a plastic cap that was snapped in place. All packing sponges were removed and there as good hemostasis in each incision.  I then placed the pump into the pocket with the side port at the 12 o'clock position and excess catheter coiled behind the pump.  I then placed vancomycin powder in each incision.  Each incision was then closed using interrupted 2-0 Vicryl pop offs.  Skin was closed using a 3-0 Stratafix suture using a subcuticular closure.  Dermabond and island dressings replaced to reach incision.  Pump is in programmed to deliver a single bolus catheter tip and started an infusion of 1.2 mg per day of hydromorphone.  Patient was rolled supine position, awakened, extubated and taken to recovery stable condition.

## 2018-05-21 NOTE — ANESTHESIA PROCEDURE NOTES
Airway  Urgency: elective    Airway not difficult    General Information and Staff    Patient location during procedure: OR  Anesthesiologist: THAD FORMAN  CRNA: VIRGINIE WALLIS    Indications and Patient Condition  Indications for airway management: airway protection    Preoxygenated: yes  Mask difficulty assessment: 1 - vent by mask    Final Airway Details  Final airway type: endotracheal airway      Successful airway: ETT  Cuffed: yes   Successful intubation technique: direct laryngoscopy  Facilitating devices/methods: intubating stylet  Endotracheal tube insertion site: oral  Blade: Ruano  Blade size: #2  ETT size: 7.5 mm  Cormack-Lehane Classification: grade I - full view of glottis  Placement verified by: chest auscultation and capnometry   Cuff volume (mL): 6  Measured from: teeth  ETT to teeth (cm): 21  Number of attempts at approach: 1

## 2019-08-01 ENCOUNTER — OFFICE (OUTPATIENT)
Dept: URBAN - METROPOLITAN AREA CLINIC 75 | Facility: CLINIC | Age: 54
End: 2019-08-01

## 2019-08-01 VITALS
HEART RATE: 96 BPM | DIASTOLIC BLOOD PRESSURE: 72 MMHG | SYSTOLIC BLOOD PRESSURE: 118 MMHG | HEIGHT: 67 IN | WEIGHT: 216 LBS

## 2019-08-01 DIAGNOSIS — K51.20 ULCERATIVE (CHRONIC) PROCTITIS WITHOUT COMPLICATIONS: ICD-10-CM

## 2019-08-01 DIAGNOSIS — R16.2 HEPATOMEGALY WITH SPLENOMEGALY, NOT ELSEWHERE CLASSIFIED: ICD-10-CM

## 2019-08-01 DIAGNOSIS — K76.0 FATTY (CHANGE OF) LIVER, NOT ELSEWHERE CLASSIFIED: ICD-10-CM

## 2019-08-01 DIAGNOSIS — K58.0 IRRITABLE BOWEL SYNDROME WITH DIARRHEA: ICD-10-CM

## 2019-08-01 PROCEDURE — 99204 OFFICE O/P NEW MOD 45 MIN: CPT

## 2019-08-01 RX ORDER — DICYCLOMINE HYDROCHLORIDE 10 MG/1
40 CAPSULE ORAL
Qty: 60 | Refills: 3 | Status: ACTIVE
Start: 2019-08-01

## 2019-09-13 ENCOUNTER — TRANSCRIBE ORDERS (OUTPATIENT)
Dept: PHYSICAL THERAPY | Facility: CLINIC | Age: 54
End: 2019-09-13

## 2019-09-13 DIAGNOSIS — M96.1 POSTLAMINECTOMY SYNDROME, CERVICAL REGION: ICD-10-CM

## 2019-09-13 DIAGNOSIS — M54.6 PAIN IN THORACIC SPINE: ICD-10-CM

## 2019-09-13 DIAGNOSIS — M25.552 BILATERAL HIP PAIN: ICD-10-CM

## 2019-09-13 DIAGNOSIS — M54.16 LUMBAR RADICULOPATHY: ICD-10-CM

## 2019-09-13 DIAGNOSIS — M25.561 RIGHT KNEE PAIN, UNSPECIFIED CHRONICITY: Primary | ICD-10-CM

## 2019-09-13 DIAGNOSIS — M25.551 BILATERAL HIP PAIN: ICD-10-CM

## 2019-09-13 DIAGNOSIS — M25.562 LEFT KNEE PAIN, UNSPECIFIED CHRONICITY: ICD-10-CM

## 2019-10-04 ENCOUNTER — TRANSCRIBE ORDERS (OUTPATIENT)
Dept: PHYSICAL THERAPY | Facility: CLINIC | Age: 54
End: 2019-10-04

## 2019-10-16 ENCOUNTER — TREATMENT (OUTPATIENT)
Dept: PHYSICAL THERAPY | Facility: CLINIC | Age: 54
End: 2019-10-16

## 2019-10-16 DIAGNOSIS — G89.29 CHRONIC PAIN OF BOTH KNEES: ICD-10-CM

## 2019-10-16 DIAGNOSIS — G89.29 CHRONIC BILATERAL LOW BACK PAIN WITH LEFT-SIDED SCIATICA: Primary | ICD-10-CM

## 2019-10-16 DIAGNOSIS — M25.562 CHRONIC PAIN OF BOTH KNEES: ICD-10-CM

## 2019-10-16 DIAGNOSIS — M54.2 PAIN, NECK: ICD-10-CM

## 2019-10-16 DIAGNOSIS — M54.42 CHRONIC BILATERAL LOW BACK PAIN WITH LEFT-SIDED SCIATICA: Primary | ICD-10-CM

## 2019-10-16 DIAGNOSIS — M25.561 CHRONIC PAIN OF BOTH KNEES: ICD-10-CM

## 2019-10-16 PROCEDURE — 97110 THERAPEUTIC EXERCISES: CPT | Performed by: PHYSICAL THERAPIST

## 2019-10-16 PROCEDURE — 97162 PT EVAL MOD COMPLEX 30 MIN: CPT | Performed by: PHYSICAL THERAPIST

## 2019-10-16 NOTE — PROGRESS NOTES
Physical Therapy Initial Evaluation and Plan of Care      Patient: No Lambert   : 1965  Diagnosis/ICD-10 Code:  Chronic bilateral low back pain with left-sided sciatica [M54.42, G89.29]  Referring practitioner: Toni Mccormack MD  Date of Initial Visit: 10/16/2019  Today's Date: 10/16/2019  Patient seen for 1 sessions           Subjective Evaluation    History of Present Illness  Onset date: chronic.  Mechanism of injury: Patient complains of chronic low back pain, neck pain, and B knee pain. Patient began having back pain after her second son was born in . She had two bulging discs after his birth. She had back surgery in  and neck surgery in . Her left knee bothers her more than the right. Last year she had a pain pump placed in her spine. She has also participated in aquatic and land PT (back in ). She has also undergone nerve ablasion. Patient reports difficulty with walking, standing, and bending. ADLs/household chores are challenging as well.       Patient Occupation: Retired from [employer], worked in [employer] Quality of life: good    Pain  Current pain ratin  At best pain ratin  At worst pain rating: 10  Location: Back, neck, knees.  Quality: dull ache  Relieving factors: relaxation and rest  Aggravating factors: lifting, standing, movement, prolonged positioning, ambulation, squatting and repetitive movement  Progression: worsening    Diagnostic Tests  X-ray: abnormal  MRI studies: abnormal    Treatments  Previous treatment: physical therapy (pain pump)  Patient Goals  Patient goals for therapy: decreased pain, increased motion and increased strength             Objective       Active Range of Motion   Cervical/Thoracic Spine     Thoracic   Flexion: 25 degrees with pain  Extension: 10 degrees with pain  Left lateral flexion: 50 degrees with pain  Right lateral flexion: 50 degrees with pain  Left Knee   Flexion: 125 degrees   Extension: 0 degrees     Right Knee    Flexion: 125 degrees   Extension: 0 degrees     Additional Active Range of Motion Details  Measured as percentage of WNL . Pain on left side of back    Strength/Myotome Testing     Left Hip   Planes of Motion   Flexion: 3+    Right Hip   Planes of Motion   Flexion: 4-    Left Knee   Flexion: 3+  Extension: 3+    Right Knee   Flexion: 4  Extension: 4    Left Ankle/Foot   Dorsiflexion: 3+  Plantar flexion: 3+    Right Ankle/Foot   Dorsiflexion: 4  Plantar flexion: 4    Additional Strength Details  Core: grossly 2+/5    Ambulation     Comments   Slow gait, L antalgia, lack of trunk rotation/pelvis rotation       See Exercise, Manual, and Modality Logs for complete treatment.           Assessment & Plan     Assessment  Impairments: abnormal gait, abnormal or restricted ROM, activity intolerance, impaired balance, impaired physical strength, lacks appropriate home exercise program and pain with function  Assessment details: No Lambert is a 54 y.o. year-old female referred to physical therapy for chronic back pain, neck pain, and B knee pain. She presents with a evolving clinical presentation.  She has comorbidities of anxiety/arthritis and personal factors of already trying multiple pain management attempt that may affect her progress in the plan of care.  Signs and symptoms are consistent with physical therapy diagnosis of chronic back, neck, and B knee pain. Objective findings include limited lumbar mobility, poor core/LE strength, and impaired gait mechanics. Patient is appropriate for skilled physical therapy in order to reduce pain and increase ease with daily mobility.   Prognosis: good  Functional Limitations: lifting, walking, uncomfortable because of pain, sitting, standing and stooping  Goals  Plan Goals: Short Term Goals for completion in 30 days:   -Patient will report a reduction in pain for 12-24 hours or greater following aquatic therapy session  -Patient will demonstrate good core stabilization  strength with advanced  aquatic exercises such as bicycle kicks and tuck ups to help improve postural stability  -Patient will increase lumbar flexion AROM to 75% of WNL and <2/10 pain in order to increase ease with bending for ADLs/household chores.    LTGs for completion within 90 days:  -Patient will increase walking tolerance from 20 min to 30 min or greater to allow for patient to walk for leisure/exercise  -Patient will demonstrate independence with water walks and stretches to promote independent managment of condition  -Patient will increase L LE strength to 4-/5 or greater to increase LE stability during gait    Plan  Therapy options: will be seen for skilled physical therapy services  Planned therapy interventions: postural training, stretching, therapeutic activities, flexibility, strengthening, gait training, home exercise program and functional ROM exercises  Other planned therapy interventions: Aquatic therapy  Frequency: 36 visits.  Plan details: Aquatic therapy for core stabilization, LE strength/stability, gait training, balance, and posture        Timed:  Manual Therapy:         mins  41238;  Therapeutic Exercise:    10     mins  99797;     Neuromuscular Guadalupe:        mins  63594;    Therapeutic Activity:          mins  31593;     Gait Training:           mins  00068;     Ultrasound:          mins  70185;    Electrical Stimulation:         mins  90189 ( );  Iontophoresis         mins 63233      Untimed:  Electrical Stimulation:         mins  05044 ( );  Mechanical Traction:         mins  56576;     Timed Treatment:   10   mins   Total Treatment:     40   mins    PT SIGNATURE: Mercedes Aragon PT   DATE TREATMENT INITIATED: 10/16/2019    Initial Certification  Certification Period: 1/14/2020  I certify that the therapy services are furnished while this patient is under my care.  The services outlined above are required by this patient, and will be reviewed every 90 days.     PHYSICIAN:  Toni Mccormack MD      DATE:     Please sign and return via fax to  .. Thank you, [facility].

## 2019-10-18 ENCOUNTER — OFFICE (OUTPATIENT)
Dept: URBAN - METROPOLITAN AREA CLINIC 46 | Facility: CLINIC | Age: 54
End: 2019-10-18

## 2019-10-18 VITALS — HEIGHT: 67 IN

## 2019-10-18 DIAGNOSIS — R16.2 HEPATOMEGALY WITH SPLENOMEGALY, NOT ELSEWHERE CLASSIFIED: ICD-10-CM

## 2019-10-18 DIAGNOSIS — K76.0 FATTY (CHANGE OF) LIVER, NOT ELSEWHERE CLASSIFIED: ICD-10-CM

## 2019-10-18 PROCEDURE — 91200 LIVER ELASTOGRAPHY: CPT

## 2019-10-23 ENCOUNTER — TREATMENT (OUTPATIENT)
Dept: PHYSICAL THERAPY | Facility: CLINIC | Age: 54
End: 2019-10-23

## 2019-10-23 PROCEDURE — 97113 AQUATIC THERAPY/EXERCISES: CPT | Performed by: PHYSICAL THERAPIST

## 2019-10-23 NOTE — PROGRESS NOTES
Physical Therapy Daily Progress Note    Patient: No Lambert   : 1965  Diagnosis/ICD-10 Code:  Chronic bilateral low back pain with left-sided sciatica [M54.42, G89.29]  Referring practitioner: Toni Mccormack MD  Date of Initial Visit:   Type: THERAPY  Noted: 10/16/2019  Today's Date: 10/23/2019  Patient seen for 2 sessions             Subjective   I have a pain pump. Pain rating today 5- 6/10. I have foot drop on the left and wear a Chepe brace.     Objective     AQUATICS LE    Water Walk  Fwd/Side/Bkwd. laps each  Stretch  1  HS 20”ea  Stretch 2  DKTC at rail w/ noodle support 15” X 2   Stretch 3  Modified child’s pose w/ LN 5” X 5  Stretch Other 1 Calf 20” X 2  Stretch Other 2 --  Vertical Traction 3 min LN  Abdominals   LN Pushdowns X 15  Clams   15X   Hip Abd/Add  --  Hip Flex/Ext  Hip Circles 10/10 Ea  March in Place --  Mini Squat  Sit/Stands X 10  Toe/Heel Raises  B (reports foot drop)  Uni-Squat  --  Uni-Clock  --  Step Ups  --  Bicycle   2 min suspended  Flutter/Scissor  15/15 seated  Exercise 1  ROWS closed paddles X 20  Exercise 2  Alt Shldr Flex/Ext X 10  Exercise 3  --      Assessment/Plan:  No reports multiple ailments and surgical history leading to her current pain and impaired functioning.  She expresses good motivation to improve her core strength and has had positive results with previous aquatic therapy.  Instruction in back rehab exercise with a variety of stretches and strengthening in standing, seated and suspended positions. No completed session without reports of increased pain.    Progress strengthening /stabilization /functional activity               Timed:  Aquatic Therapy    40     mins 08867;    Cleve Armstrong, PT  Physical Therapist

## 2019-10-30 ENCOUNTER — TREATMENT (OUTPATIENT)
Dept: PHYSICAL THERAPY | Facility: CLINIC | Age: 54
End: 2019-10-30

## 2019-10-30 DIAGNOSIS — M25.562 CHRONIC PAIN OF BOTH KNEES: ICD-10-CM

## 2019-10-30 DIAGNOSIS — M25.561 CHRONIC PAIN OF BOTH KNEES: ICD-10-CM

## 2019-10-30 DIAGNOSIS — G89.29 CHRONIC BILATERAL LOW BACK PAIN WITH LEFT-SIDED SCIATICA: Primary | ICD-10-CM

## 2019-10-30 DIAGNOSIS — G89.29 CHRONIC PAIN OF BOTH KNEES: ICD-10-CM

## 2019-10-30 DIAGNOSIS — M54.42 CHRONIC BILATERAL LOW BACK PAIN WITH LEFT-SIDED SCIATICA: Primary | ICD-10-CM

## 2019-10-30 PROCEDURE — 97113 AQUATIC THERAPY/EXERCISES: CPT | Performed by: PHYSICAL THERAPIST

## 2019-10-30 NOTE — PROGRESS NOTES
Physical Therapy Daily Progress Note    Patient: No Lambert   : 1965  Diagnosis/ICD-10 Code:  Chronic bilateral low back pain with left-sided sciatica [M54.42, G89.29]  Referring practitioner: Toni Mccormack MD  Date of Initial Visit:   Type: THERAPY  Noted: 10/16/2019  Today's Date: 10/30/2019  Patient seen for 2 sessions             Subjective   I saw Pain Management to get my pain medicine changed out of my pain pump.  Pain rating today 5/10.      Objective     AQUATICS LE    Water Walk                 Fwd/Side/Bkwd. laps each  Stretch  1                     HS 20”ea  Stretch 2                      DKTC at rail w/ noodle support 15” X 2   Stretch 3                      Modified child’s pose w/ LN 5” X 5  Stretch Other 1           Calf 20” X 2  Stretch Other 2           --  Vertical Traction          3 min LN  Abdominals                 LN Pushdowns X 15  Clams                          15X   Hip Abd/Add               15X each  Hip Flex/Ext                 --  March in Place            --  Mini Squat                   Sit/Stands X 10  Toe/Heel Raises          B (reports foot drop)  Uni-Squat                    --  Uni-Clock                    --  Step Ups                     --  Bicycle                         2 min suspended  Flutter/Scissor             15/15 seated  Exercise 1                   ROWS closed paddles X 20  Exercise 2                   Alt Shldr Flex/Ext w/ open paddles X 10    Assessment/Plan  Needs verbal cues for hip abduction to avoid compensation with flexion/extension of knee and controlling range of motion.  Added open paddle resistance to shoulder flexion/extension in deep water without problem today.     Progress per Plan of Care and Progress strengthening /stabilization /functional activity               Timed:  Aquatic Therapy    27     mins 46213;    Cleve Armstrong, PT  Physical Therapist

## 2019-11-06 ENCOUNTER — TREATMENT (OUTPATIENT)
Dept: PHYSICAL THERAPY | Facility: CLINIC | Age: 54
End: 2019-11-06

## 2019-11-06 DIAGNOSIS — G89.29 CHRONIC BILATERAL LOW BACK PAIN WITH LEFT-SIDED SCIATICA: Primary | ICD-10-CM

## 2019-11-06 DIAGNOSIS — M54.2 PAIN, NECK: ICD-10-CM

## 2019-11-06 DIAGNOSIS — M25.562 CHRONIC PAIN OF BOTH KNEES: ICD-10-CM

## 2019-11-06 DIAGNOSIS — G89.29 CHRONIC PAIN OF BOTH KNEES: ICD-10-CM

## 2019-11-06 DIAGNOSIS — M54.42 CHRONIC BILATERAL LOW BACK PAIN WITH LEFT-SIDED SCIATICA: Primary | ICD-10-CM

## 2019-11-06 DIAGNOSIS — M25.561 CHRONIC PAIN OF BOTH KNEES: ICD-10-CM

## 2019-11-06 PROCEDURE — 97113 AQUATIC THERAPY/EXERCISES: CPT | Performed by: PHYSICAL THERAPIST

## 2019-11-06 NOTE — PROGRESS NOTES
Physical Therapy Daily Progress Note    Patient: No Lambert   : 1965  Diagnosis/ICD-10 Code:  Chronic bilateral low back pain with left-sided sciatica [M54.42, G89.29]  Referring practitioner: Toni Mccormack MD  Date of Initial Visit:   Type: THERAPY  Noted: 10/16/2019  Today's Date: 2019  Patient seen for 3 sessions             Subjective   I got a new back brace from my doctor. Wore it to [employer]- felt good, but still getting used to it. My hips are hurting more.     Objective     AQUATICS LE    Water Walk  Independent  Stretch  1  Hip sweeps SN under flexed knee X 10 ea  Stretch 2  --   Vertical Traction Independent  Abdominals   LN Pushdowns Fwd X 15, Obliques X 10 ea  Clams   --  Hip Abd/Add  10X each holding lg noodle  Hip Flex/Ext  Hip Circles 10/10 ea  March in Place --  Mini Squat  --  Bicycle   2 min suspended  Flutter/Scissor  --  Exercise 1  Hip Extension press w/ small noodle behind distal thigh X 15 ea  Exercise 2  Shoulder Abduction/Adduction w/ medium foam dumbbells X 15  Exercise 3  TuckUps X 20      Assessment/Plan  Progressed stabilization  exercises by using foam noodle for UE support vs railing with leg lifts.  Added hip Sweeps with small noodle support under flexed knee. Next visit add Rows with paddles.  Also No needs copy of instructions for her prescribed aquatic exercises as she plans on joining the pool at completion of PT.     Progress per Plan of Care and Progress strengthening /stabilization /functional activity               Timed:  Aquatic Therapy    30     mins 13521;    Cleve Armstrong, PT  Physical Therapist

## 2019-11-13 ENCOUNTER — TREATMENT (OUTPATIENT)
Dept: PHYSICAL THERAPY | Facility: CLINIC | Age: 54
End: 2019-11-13

## 2019-11-13 DIAGNOSIS — M25.561 CHRONIC PAIN OF BOTH KNEES: ICD-10-CM

## 2019-11-13 DIAGNOSIS — M54.2 PAIN, NECK: ICD-10-CM

## 2019-11-13 DIAGNOSIS — G89.29 CHRONIC PAIN OF BOTH KNEES: ICD-10-CM

## 2019-11-13 DIAGNOSIS — G89.29 CHRONIC BILATERAL LOW BACK PAIN WITH LEFT-SIDED SCIATICA: Primary | ICD-10-CM

## 2019-11-13 DIAGNOSIS — M54.42 CHRONIC BILATERAL LOW BACK PAIN WITH LEFT-SIDED SCIATICA: Primary | ICD-10-CM

## 2019-11-13 DIAGNOSIS — M25.562 CHRONIC PAIN OF BOTH KNEES: ICD-10-CM

## 2019-11-13 PROCEDURE — 97113 AQUATIC THERAPY/EXERCISES: CPT | Performed by: PHYSICAL THERAPIST

## 2019-11-13 NOTE — PROGRESS NOTES
"Physical Therapy Daily Progress Note    Patient: No Lambert   : 1965  Diagnosis/ICD-10 Code:  Chronic bilateral low back pain with left-sided sciatica [M54.42, G89.29]  Referring practitioner: Toni Mccormack MD  Date of Initial Visit: Type: THERAPY  Noted: 10/16/2019  Today's Date: 2019  Patient seen for 4 sessions             Subjective   Didn’t sleep well last night. Pain 5/10. This weather doesn’t help. Left shoulder has been aggravated last couple days.  All my fingers hurt me today, I wonder if that's arthritis.  [facility]    Objective     AQUATICS LE    Stretch  1                     Hip sweeps SN under flexed knee X 10 ea  Stretch 2                      HS 20\" X 2 ea         Vertical Traction          Independent  Abdominals                 LN Pushdowns Fwd X 15, Obliques X 10 ea  Clams                          --  Hip Abd/Add                15X each holding rail  Hip Flex/Ext                 Hip Circles 10/10 ea  Rows                           Paddles L 5 X 15  Mini Squat                   --  Bicycle                         2 min suspended  Alt Shldr Flex/Ext         Paddles L1 X 12  Exercise 1                   Hip Extension press w/ large noodle behind distal thigh X 15 ea  Exercise 2                   Shoulder Abduction/Adduction w/ small foam dumbbells X 15  Exercise 3                   TuckUps X 20       Assessment/Plan  Progressed resistance from small noodle to large noodle with hip extension exercise, however lessened resistance with shoulder abduction/adduction due to left shoulder aggravation last couple days.   Provided written instructions with pictures that were used as a guide during today’s session. No [facility].    Anticipate DC next Visit               Timed:  Aquatic Therapy    30     mins 23331;    Cleve Armstrong, PT  Physical Therapist        "

## 2019-11-14 ENCOUNTER — OFFICE (OUTPATIENT)
Dept: URBAN - METROPOLITAN AREA CLINIC 75 | Facility: CLINIC | Age: 54
End: 2019-11-14

## 2019-11-14 VITALS
HEART RATE: 82 BPM | DIASTOLIC BLOOD PRESSURE: 90 MMHG | WEIGHT: 229 LBS | HEIGHT: 67 IN | OXYGEN SATURATION: 97 % | SYSTOLIC BLOOD PRESSURE: 130 MMHG

## 2019-11-14 DIAGNOSIS — R60.1 GENERALIZED EDEMA: ICD-10-CM

## 2019-11-14 DIAGNOSIS — R16.2 HEPATOMEGALY WITH SPLENOMEGALY, NOT ELSEWHERE CLASSIFIED: ICD-10-CM

## 2019-11-14 DIAGNOSIS — Z86.010 PERSONAL HISTORY OF COLONIC POLYPS: ICD-10-CM

## 2019-11-14 DIAGNOSIS — E03.9 HYPOTHYROIDISM, UNSPECIFIED: ICD-10-CM

## 2019-11-14 DIAGNOSIS — K75.81 NONALCOHOLIC STEATOHEPATITIS (NASH): ICD-10-CM

## 2019-11-14 DIAGNOSIS — K58.0 IRRITABLE BOWEL SYNDROME WITH DIARRHEA: ICD-10-CM

## 2019-11-14 DIAGNOSIS — R53.83 OTHER FATIGUE: ICD-10-CM

## 2019-11-14 PROCEDURE — 99214 OFFICE O/P EST MOD 30 MIN: CPT

## 2019-11-14 RX ORDER — OMEPRAZOLE 40 MG/1
CAPSULE, DELAYED RELEASE ORAL
Qty: 30 | Status: COMPLETED
End: 2019-11-14

## 2019-11-14 RX ORDER — PANTOPRAZOLE SODIUM 40 MG/1
40 TABLET, DELAYED RELEASE ORAL
Qty: 90 | Refills: 3 | Status: ACTIVE
Start: 2019-11-14

## 2019-11-14 RX ORDER — FUROSEMIDE 40 MG/1
TABLET ORAL
Qty: 180 | Refills: 1 | Status: ACTIVE

## 2019-11-14 RX ORDER — URSODIOL 500 MG/1
1000 TABLET ORAL
Qty: 60 | Refills: 11 | Status: ACTIVE
Start: 2019-11-14

## 2019-11-20 ENCOUNTER — TREATMENT (OUTPATIENT)
Dept: PHYSICAL THERAPY | Facility: CLINIC | Age: 54
End: 2019-11-20

## 2019-11-20 DIAGNOSIS — M25.562 CHRONIC PAIN OF BOTH KNEES: ICD-10-CM

## 2019-11-20 DIAGNOSIS — G89.29 CHRONIC PAIN OF BOTH KNEES: ICD-10-CM

## 2019-11-20 DIAGNOSIS — M25.561 CHRONIC PAIN OF BOTH KNEES: ICD-10-CM

## 2019-11-20 DIAGNOSIS — M54.2 PAIN, NECK: ICD-10-CM

## 2019-11-20 DIAGNOSIS — G89.29 CHRONIC BILATERAL LOW BACK PAIN WITH LEFT-SIDED SCIATICA: Primary | ICD-10-CM

## 2019-11-20 DIAGNOSIS — M54.42 CHRONIC BILATERAL LOW BACK PAIN WITH LEFT-SIDED SCIATICA: Primary | ICD-10-CM

## 2019-11-20 PROCEDURE — 97113 AQUATIC THERAPY/EXERCISES: CPT | Performed by: PHYSICAL THERAPIST

## 2019-11-20 NOTE — PROGRESS NOTES
"Physical Therapy Daily Progress Note/Discharge    Patient: No Lambert   : 1965  Diagnosis/ICD-10 Code:  Chronic bilateral low back pain with left-sided sciatica [M54.42, G89.29]  Referring practitioner: Toni Mccormack MD  Date of Initial Visit:   Type: THERAPY  Noted: 10/16/2019  Today's Date: 2019  Patient seen for 5 sessions             Subjective   [facility]. Didn’t sleep well.     Objective     AQUATICS THERAPY  Stretch  1                      Calf 20\" X 2 ea  Stretch 2                      HS 20\" X 2 ea         Stretch                          DKTC   20\" X 2  Vertical Traction          Independent  Abdominals                 LN Pushdowns Fwd X 15, Obliques X 10 ea  Clams                          --  Hip Abd/Add                15X each holding rail  Hip Flex/Ext                 Hip Circles 10/10 ea  Rows                           Paddles L 5 X 12  Mini Squat                   --  Bicycle                         2 min suspended  Alt Shldr Flex/Ext         Paddles L1 X 12  Shldr ER/IR                 Paddles L 3 X 10  Exercise 1                   Leg Press w/ lg foam ring X 12 ea  Exercise 2                   Shoulder Abduction/Adduction w/ small foam dumbbells X 15  Exercise 3                   TuckUps X 15  Heel/Toe Raises          12/12 B          Assessment/Plan  No demonstrates independence with the prescribed aquatic exercises and was issued written directions with pictures.  [facility] to continue [facility] on her own.    Goals  Plan Goals: Short Term Goals for completion in 30 days:   -Patient will report a reduction in pain for 12-24 hours or greater following aquatic therapy session.  MET  -Patient will demonstrate good core stabilization strength with advanced  aquatic exercises such as bicycle kicks and tuck ups to help improve postural stability.  MET  -Patient will increase lumbar flexion AROM to 75% of " WNL and <2/10 pain in order to increase ease with bending for ADLs/household chores.  MET    LTGs for completion within 90 days:  -Patient will increase walking tolerance from 20 min to 30 min or greater to allow for patient to walk for leisure/exercise.  Partially Met   -Can only walk 30 minutes if holding onto a shopping cart.  -Patient will demonstrate independence with water walks and stretches to promote independent managment of condition.  MET  -Patient will increase L LE strength to 4-/5 or greater to increase LE stability during gait.  Partially MET  Other  Discharge from formal physical therapy.             Timed:  Aquatic Therapy    39     mins 80545;    Cleve Armstrong, PT  Physical Therapist

## 2020-01-16 PROBLEM — R10.13 EPIGASTRIC PAIN: Status: ACTIVE | Noted: 2020-01-17

## 2020-01-16 PROBLEM — R13.10 DYSPHAGIA: Status: ACTIVE | Noted: 2020-01-17

## 2021-06-15 ENCOUNTER — TRANSCRIBE ORDERS (OUTPATIENT)
Dept: PHYSICAL THERAPY | Facility: CLINIC | Age: 56
End: 2021-06-15

## 2021-06-15 DIAGNOSIS — M54.6 PAIN IN THORACIC SPINE: ICD-10-CM

## 2021-06-15 DIAGNOSIS — M96.1 POSTLAMINECTOMY SYNDROME, CERVICAL REGION: ICD-10-CM

## 2021-06-15 DIAGNOSIS — M54.16 LUMBAR RADICULOPATHY: ICD-10-CM

## 2021-06-15 DIAGNOSIS — M53.3 DISORDER OF SACRUM: Primary | ICD-10-CM

## 2021-06-28 ENCOUNTER — TELEPHONE (OUTPATIENT)
Dept: PHYSICAL THERAPY | Facility: CLINIC | Age: 56
End: 2021-06-28

## 2021-08-02 ENCOUNTER — TREATMENT (OUTPATIENT)
Dept: PHYSICAL THERAPY | Facility: CLINIC | Age: 56
End: 2021-08-02

## 2021-08-02 DIAGNOSIS — G89.29 CHRONIC BILATERAL LOW BACK PAIN WITH LEFT-SIDED SCIATICA: Primary | ICD-10-CM

## 2021-08-02 DIAGNOSIS — M25.562 CHRONIC PAIN OF BOTH KNEES: ICD-10-CM

## 2021-08-02 DIAGNOSIS — G89.29 CHRONIC PAIN OF BOTH KNEES: ICD-10-CM

## 2021-08-02 DIAGNOSIS — M25.561 CHRONIC PAIN OF BOTH KNEES: ICD-10-CM

## 2021-08-02 DIAGNOSIS — M54.42 CHRONIC BILATERAL LOW BACK PAIN WITH LEFT-SIDED SCIATICA: Primary | ICD-10-CM

## 2021-08-02 DIAGNOSIS — M54.2 PAIN, NECK: ICD-10-CM

## 2021-08-02 PROCEDURE — 97162 PT EVAL MOD COMPLEX 30 MIN: CPT | Performed by: PHYSICAL THERAPIST

## 2021-08-02 PROCEDURE — 97110 THERAPEUTIC EXERCISES: CPT | Performed by: PHYSICAL THERAPIST

## 2021-08-02 NOTE — PROGRESS NOTES
Physical Therapy Initial Evaluation and Plan of Care      Patient: No Lambert   : 1965  Diagnosis/ICD-10 Code:  Chronic bilateral low back pain with left-sided sciatica [M54.42, G89.29]  Referring practitioner: Toni Mccormack MD  Date of Initial Visit: 2021  Today's Date: 2021  Patient seen for 1 sessions           Subjective Evaluation    History of Present Illness  Mechanism of injury: Patient complains of chronic low back pain, neck pain, and B knee pain. Patient began having back pain after her second son was born in . She had two bulging discs after his birth. She had back surgery in  and neck surgery in . Her left knee bothers her more than the right. L knee has a history of a Baker's cyst and a Fx in .  In 2018, she had a pain pump placed in her spine. She has also participated in aquatic and land PT (back in //). She has also undergone nerve ablasion.    Patient reports difficulty with walking, standing, and bending. She is only able to walk/stand for 5 minutes at a time. Patient reports that ADLs/household chores are challenging as well, especially when doing chores that require cervical extension. She wakes up around 4x/night due to pain. 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    PMH: CHF. Pre-diabetes. Inactive thyroid.   Scoliosis, stenosis in lumbar spine, drop foot on the L, bursitis in L hip.    PLOF: Patient reports that she walks her dog for around 5-10 minutes and she stretches daily. She also exercises in her neighborhood pool a couple of times per week.      Patient Occupation: retired Quality of life: poor    Pain  Current pain ratin  At best pain ratin  At worst pain rating: 10  Location: neck, back, B knees  Quality: tight and discomfort  Relieving factors: heat, change in position and medications (whirlpool tub, warm water, localized pain  pump)  Aggravating factors: lifting, movement, overhead activity, standing, ambulation, stairs, squatting and sleeping  Progression: worsening    Social Support  Lives in: one-story house  Lives with: adult children    Diagnostic Tests  MRI studies: abnormal (spinal stenosis, bulging discs)    Treatments  Previous treatment: physical therapy (surgery)  Current treatment: medication  Current treatment comments: pain pump.     Patient Goals  Patient goals for therapy: decreased pain, increased strength, independence with ADLs/IADLs and increased motion             Objective          Active Range of Motion   Cervical/Thoracic Spine   Cervical    Left rotation: 11 degrees with pain  Right rotation: 26 degrees with pain    Lumbar   Flexion: 75 degrees with pain  Extension: WFL and with pain  Left lateral flexion: 75 degrees with pain  Right lateral flexion: WFL and with pain  Left Knee   Flexion: 120 degrees   Extension: 0 degrees     Right Knee   Flexion: 125 degrees   Extension: 0 degrees     Additional Active Range of Motion Details  Lumbar ROM measured as a percentage of WNL.    Strength/Myotome Testing     Left Shoulder     Planes of Motion   Flexion: 4-     Right Shoulder     Planes of Motion   Flexion: 4     Left Hip   Planes of Motion   Flexion: 3  Abduction: 4 (tested in sitting)    Right Hip   Planes of Motion   Flexion: 4-  Abduction: 4 (tested in sitting)    Left Knee   Flexion: 3  Extension: 3+    Right Knee   Flexion: 4-  Extension: 4-    Left Ankle/Foot   Dorsiflexion: 4-  Plantar flexion: 4-    Right Ankle/Foot   Dorsiflexion: 4+  Plantar flexion: 4    Ambulation     Comments   Supination on R foot, wide arm swing, genu varum bilat, uncompensated Trendelenberg bilat         Functional Outcome Scores: Oswestry: 56% disability; LEFS: 39/80; NDI: 48% disability    EXERCISES:  -cervical rotation in supine 10x B  -PPT 20x  -SLR 20x B    Assessment & Plan     Assessment  Impairments: abnormal gait, activity  intolerance, impaired balance, impaired physical strength, lacks appropriate home exercise program and pain with function  Assessment details: No Lambert is a 56 y.o. year-old female referred to physical therapy for chronic low back pain. She presents with a evolving clinical presentation.  She has comorbidities of Congestive Heart Failure and pre-diabetes and personal factors of being the primary caretaker for her adult son with high-functioning autism that may affect her progress in the plan of care.  Signs and symptoms are consistent with physical therapy diagnosis of multiple joint pain and difficulty walking. Patient is appropriate for skilled physical therapy in order to reduce pain and increase ease with daily mobility.   Prognosis: good  Functional Limitations: lifting, sleeping, walking, uncomfortable because of pain, moving in bed, sitting and standing  Goals  Plan Goals: Short Term Goals for completion in 30 days:   -Patient will report a reduction in pain for 12-24 hours or greater following aquatic therapy session  -Patient will demonstrate good core stabilization strength with advanced  aquatic exercises such as bicycle kicks and tuck ups to help improve postural stability  -Patient will report increased standing tolerance from 5 min to 10 min or greater to allow patient to complete ADLs such as cooking without pain/discomfort      LTGs for completion within 90 days:  -Patient will increase walking tolerance from 5 min to 10 min or greater to allow for patient to walk for leisure/exercise  -Patient will demonstrate independence with water walks and stretches to promote independent managment of condition  -Patient will increase LE strength on the L to 4/5 or greater to increase LE stability during gait  -Patient will increase her L cervical ROM from 11 degrees to 25 degrees to increase ease w/ ADLs like driving.    Plan  Therapy options: will be seen for skilled physical therapy services  Planned  therapy interventions: postural training, stretching, therapeutic activities, functional ROM exercises, flexibility, gait training, home exercise program, strengthening, manual therapy and balance/weight-bearing training  Other planned therapy interventions: Aquatic therapy  Frequency: 2x week (36 visits)  Treatment plan discussed with: patient  Plan details: Aquatic therapy for core stabilization, LE strength/stability, gait training, balance, knee/cervical/shoulder ROM, and posture        Timed:  Manual Therapy:         mins  33786;  Therapeutic Exercise:    9     mins  82158;     Neuromuscular Guadalupe:        mins  67447;    Therapeutic Activity:          mins  78958;     Gait Training:           mins  37023;     Ultrasound:          mins  42760;    Electrical Stimulation:         mins  15022 ( );  Iontophoresis         mins 69145  Dry Needling        mins      Untimed:  Electrical Stimulation:         mins  95233 ( );  Mechanical Traction:         mins  15125;     Timed Treatment:   9   mins   Total Treatment:     42   mins    PT SIGNATURE: Mercedes Aragon PT   DATE TREATMENT INITIATED: 8/2/2021    Initial Certification  Certification Period: 10/31/2021  I certify that the therapy services are furnished while this patient is under my care.  The services outlined above are required by this patient, and will be reviewed every 90 days.     PHYSICIAN: Toni Mccormack MD      DATE:     Please sign and return via fax to [phone] Thank you, [facility].

## 2021-08-11 ENCOUNTER — TREATMENT (OUTPATIENT)
Dept: PHYSICAL THERAPY | Facility: CLINIC | Age: 56
End: 2021-08-11

## 2021-08-11 DIAGNOSIS — G89.29 CHRONIC PAIN OF BOTH KNEES: ICD-10-CM

## 2021-08-11 DIAGNOSIS — G89.29 CHRONIC BILATERAL LOW BACK PAIN WITH LEFT-SIDED SCIATICA: Primary | ICD-10-CM

## 2021-08-11 DIAGNOSIS — M25.561 CHRONIC PAIN OF BOTH KNEES: ICD-10-CM

## 2021-08-11 DIAGNOSIS — M54.42 CHRONIC BILATERAL LOW BACK PAIN WITH LEFT-SIDED SCIATICA: Primary | ICD-10-CM

## 2021-08-11 DIAGNOSIS — M25.562 CHRONIC PAIN OF BOTH KNEES: ICD-10-CM

## 2021-08-11 DIAGNOSIS — M54.2 PAIN, NECK: ICD-10-CM

## 2021-08-11 PROCEDURE — 97113 AQUATIC THERAPY/EXERCISES: CPT | Performed by: PHYSICAL THERAPIST

## 2021-08-11 NOTE — PROGRESS NOTES
Physical Therapy Daily Progress Note    Patient: No Lambert   : 1965  Diagnosis/ICD-10 Code:  Chronic bilateral low back pain with left-sided sciatica [M54.42, G89.29]  Referring practitioner: Toni Mccormack MD  Date of Initial Visit: Type: THERAPY  Noted: 2021  Today's Date: 2021  Patient seen for 2 sessions             Subjective Evaluation    History of Present Illness    Subjective comment: I've been doing therapy here off and on since , started with ANGIE upstairs and have worked with Krystina and Cleve in the pool.  I still try to do land and water ex from previous therapy when I can.  I get spasms in my legs/feet at times - they have me on potassium and water pills but haven't taken yet today.  I have drop foot on the Left and an implanted pain stimulator.       Objective     AQUATIC EX:    Water Walk   Fwd/Side/Bkwd x 2 laps each  Stretch 1   Calf stretch 20 sec x 2   Stretch 2   HS 20 sec x 2  Stretch 3   Wall (alt between bent / straight knees) 30 sec x 2  Stretch Other 1  -  Stretch Other 2  -  Vertical Traction  LN x 3 min  Abdominals   LN x 12  Clams    15x, seated  Hip Abd/Add   12x  Hip Flex/Ext   -  March in Place  15x  Mini Squat   10x, gluteal squeeze, (sumo squat position)  Toe/Heel Raises  Attempted but discontinued 2/2 poor quality   Uni-Squat   -  Uni-Clock   -  Hip circles cw/ccw -  Step Ups   -  STS from pool bench  10x, no hands  Bicycle   2 min seated, 1 min suspended  Flutter/Scissor  15 / 15, seated  Exercise 1   Seated alt LAQ x 15  Exercise 2   -  Exercise 3   -  Exercise 4   -  Exercise 5  -  Exercise 6  -      Assessment & Plan     Assessment  Assessment details: Patient seen today for initial aquatic therapy session including education and instruction in basic aquatic ex/activity for mobility, flexibility, and strength/stabilization. She noted some intermittent lower back and L hip pain/discomfort and spasms in legs during session.  She adopts widened JUDITH for  noodle abs and mini squats to help with balance/stabilization.  She was unable to lift heels for heel raises without hip/knee flexion.  PT provided demonstration and cuing throughout session for optimal posture, core/glut activation, and correct form/technique with ex/activity.    Plan:  Assess response to initial aquatic session and modify/progress as appropriate.           Progress per Plan of Care           Timed:  Aquatic Therapy    41     mins 58046;    Beti Fajardo, PT  Physical Therapist

## 2021-08-16 ENCOUNTER — TREATMENT (OUTPATIENT)
Dept: PHYSICAL THERAPY | Facility: CLINIC | Age: 56
End: 2021-08-16

## 2021-08-16 DIAGNOSIS — G89.29 CHRONIC PAIN OF BOTH KNEES: ICD-10-CM

## 2021-08-16 DIAGNOSIS — M25.562 CHRONIC PAIN OF BOTH KNEES: ICD-10-CM

## 2021-08-16 DIAGNOSIS — M54.42 CHRONIC BILATERAL LOW BACK PAIN WITH LEFT-SIDED SCIATICA: Primary | ICD-10-CM

## 2021-08-16 DIAGNOSIS — G89.29 CHRONIC BILATERAL LOW BACK PAIN WITH LEFT-SIDED SCIATICA: Primary | ICD-10-CM

## 2021-08-16 DIAGNOSIS — M25.561 CHRONIC PAIN OF BOTH KNEES: ICD-10-CM

## 2021-08-16 DIAGNOSIS — M54.2 PAIN, NECK: ICD-10-CM

## 2021-08-16 PROCEDURE — 97113 AQUATIC THERAPY/EXERCISES: CPT | Performed by: PHYSICAL THERAPIST

## 2021-08-16 NOTE — PROGRESS NOTES
Physical Therapy Daily Progress Note    Patient: No Lambert   : 1965  Diagnosis/ICD-10 Code:  Chronic bilateral low back pain with left-sided sciatica [M54.42, G89.29]  Referring practitioner: Toni Mccormack MD  Date of Initial Visit: Type: THERAPY  Noted: 2021  Today's Date: 2021  Patient seen for 3 sessions             Subjective   The rain always makes things worse.    Objective     AQUATIC EXERCISE:     Water Walk                 Fwd/Side/Bkwd x 2 laps each  Stretch                       Calf stretch 20 sec x 2   Stretch                       HS w/ LN at ankle 30 sec each  Stretch                       Piriformis 20 sec x 2 each  Hip Sweeps w/ LN under bent knee X 10 each  Stretch 3                      Wall Crawl (alt between bent / straight knees) X 1 minute  Vertical Traction          LN x 3 min  Abdominals                 LN x 15  Hip Abd/Add                15x  Hip Flex/Ext                 -  March in Place            15x  Leg Press w/ lg fom ring X 15 each  Rows w/ closed paddles X 15  UTR w/ noodle X 10  Mini Squat                   15x, (sumo squat position)   Hip circles cw/ccw       -  Step Ups                     -  Sit to  Stand from pool bench  10x, no hands  Bicycle                         2 min suspended  Flutter/Scissor             15 / 15, seated    Assessment/Plan  Progressed program this visit with additional core stabilization and stretching.  Session completed without problem. Patient plans to come for short amount of sessions due to high co pay and then [facility] and [facility]  Progress strengthening /stabilization /functional activity           Timed:  Aquatic Therapy    45     mins 02271;    Cleve Armstrong, PT  Physical Therapist

## 2021-08-18 ENCOUNTER — TREATMENT (OUTPATIENT)
Dept: PHYSICAL THERAPY | Facility: CLINIC | Age: 56
End: 2021-08-18

## 2021-08-18 DIAGNOSIS — G89.29 CHRONIC BILATERAL LOW BACK PAIN WITH LEFT-SIDED SCIATICA: Primary | ICD-10-CM

## 2021-08-18 DIAGNOSIS — M25.561 CHRONIC PAIN OF BOTH KNEES: ICD-10-CM

## 2021-08-18 DIAGNOSIS — M54.42 CHRONIC BILATERAL LOW BACK PAIN WITH LEFT-SIDED SCIATICA: Primary | ICD-10-CM

## 2021-08-18 DIAGNOSIS — M54.2 PAIN, NECK: ICD-10-CM

## 2021-08-18 DIAGNOSIS — G89.29 CHRONIC PAIN OF BOTH KNEES: ICD-10-CM

## 2021-08-18 DIAGNOSIS — M25.562 CHRONIC PAIN OF BOTH KNEES: ICD-10-CM

## 2021-08-18 PROCEDURE — 97113 AQUATIC THERAPY/EXERCISES: CPT | Performed by: PHYSICAL THERAPIST

## 2021-08-18 NOTE — PROGRESS NOTES
Physical Therapy Daily Progress Note    Patient: No Lambert   : 1965  Diagnosis/ICD-10 Code:  Chronic bilateral low back pain with left-sided sciatica [M54.42, G89.29]  Referring practitioner: Toni Mccormack MD  Date of Initial Visit: Type: THERAPY  Noted: 2021  Today's Date: 2021  Patient seen for 4 sessions             Subjective   Planning for shoulder surgery in the fall.  Wants to continue water therapy on her own after next appt. due to high copay.    Objective  AQUATIC EXERCISE:     Water Walk                 Fwd/Side/Bkwd x 2 laps each  Stretch                       Calf stretch 20 sec x 2   Stretch                       HS w/ LN at ankle 30 sec each  Stretch                       Piriformis 20 sec x 2 each  Hip Sweeps w/ LN under bent knee X 10 each  Stretch 3                      Wall Crawl (alt between bent / straight knees) X 1 minute  Vertical Traction          LN x 3 min  Abdominals                 LN x 15  Hip Abd/Add                15x  Hip Flex/Ext                 15X  Hip Circles                   10/10  Leg Press w/ lg fom ring X 15 each  Rows w/ closed paddles X 15  UTR w/ noodle X 10  Mini Squat                   15x, (feet hip width)   Sit to  Stand from pool bench  10x, no hands  Bicycle                         2 min suspended    Assessment/Plan  Provided written instructions with pictures of aquatic exercises to assist with patient becoming independent with prescribed exercise program.  We also discussed group water class appropriate for her that is called Water Wellness -designed for those individuals with arthritis.  Plan: See one more visit, then discharge.           Timed:  Aquatic Therapy    40     mins 60793;    Cleve Armstrong, PT  Physical Therapist

## 2021-08-23 ENCOUNTER — TREATMENT (OUTPATIENT)
Dept: PHYSICAL THERAPY | Facility: CLINIC | Age: 56
End: 2021-08-23

## 2021-08-23 DIAGNOSIS — M54.42 CHRONIC BILATERAL LOW BACK PAIN WITH LEFT-SIDED SCIATICA: Primary | ICD-10-CM

## 2021-08-23 DIAGNOSIS — M25.561 CHRONIC PAIN OF BOTH KNEES: ICD-10-CM

## 2021-08-23 DIAGNOSIS — M25.562 CHRONIC PAIN OF BOTH KNEES: ICD-10-CM

## 2021-08-23 DIAGNOSIS — G89.29 CHRONIC PAIN OF BOTH KNEES: ICD-10-CM

## 2021-08-23 DIAGNOSIS — M54.2 PAIN, NECK: ICD-10-CM

## 2021-08-23 DIAGNOSIS — G89.29 CHRONIC BILATERAL LOW BACK PAIN WITH LEFT-SIDED SCIATICA: Primary | ICD-10-CM

## 2021-08-23 PROCEDURE — 97113 AQUATIC THERAPY/EXERCISES: CPT | Performed by: PHYSICAL THERAPIST

## 2021-08-23 NOTE — PROGRESS NOTES
Physical Therapy Daily Progress Note/Discharge Note    Patient: No Lambert   : 1965  Diagnosis/ICD-10 Code:  Chronic bilateral low back pain with left-sided sciatica [M54.42, G89.29]  Referring practitioner: Toni Mccormack MD  Date of Initial Visit: Type: THERAPY  Noted: 2021  Today's Date: 2021  Patient seen for 5 sessions             Subjective   Pain is 5-6/10.  I walked an hour in my neighborhood pool on Saturday.  Didn't go yesterday because there were too many kids there. My shoulders are hurting.    Objective       Cervical Spine AROM:  Rotation right 15 degrees    left 15 degrees       MMT:  LEFT: Hip Flexion 4-/5             Knee EXTENSION 4-/5             Knee FLEXION 4/5             Ankle DF 3+/5  (Has foot drop, wears ankle brace)    AQUATIC EXERCISE:     Water Walk                 Fwd/Side/Bkwd x 2 laps each  Stretch                       Calf stretch 20 sec x 2   Stretch                       HS w/ LN at ankle 30 sec each  Stretch                       Piriformis 20 sec x 2 each  Hip Sweeps w/ LN under bent knee X 10 each  Stretch               Wall Crawl (alt between bent /straight knees) X 1 minute  Vertical Traction          LN x 3 min  Abdominals                 LN x 15  Hip Abd/Add                15x  Hip Flex/Ext                 15X  Hip Circles                   10/10  Leg Press w/ lg foam ring X 15 each  Rows w/ L2 paddles X 15  UTR w/ noodle X 10  Mini Squat                   15x, (feet hip width)   Sit to  Stand from pool bench  10x, no hands  Bicycle                         2 min suspended    Assessment/Plan       Limited UE exercise as patient is to be scheduled for shoulder surgery in the fall.  She requested discharge at this time due to high insurance copay.  [facility] and continue aquatic exercise on her own.  Issued written copy with pictures of aquatic exercises and discussed group class designed for those with arthritis.       Short Term Goals:   -Patient will report a reduction in pain for 12-24 hours or greater following aquatic therapy session. MET  -Patient will demonstrate good core stabilization strength with advanced  aquatic exercises such as bicycle kicks and tuck ups to help improve postural stability. Partially Met -Independent with bicycles, Tuck ups not performed.  -Patient will report increased standing tolerance from 5 min to 10 min or greater to allow patient to complete ADLs such as cooking without pain/discomfort. Partially Met, patient states sometimes she can stand for 10 minutes and other times only 5 minutes.      LTGs for completion within 90 days:  -Patient will increase walking tolerance from 5 min to 10 min or greater to allow for patient to walk for  Exercise/leisure.  MET  -Patient will demonstrate independence with water walks and stretches to promote independent managment of condition MET  -Patient will increase LE strength on the L to 4/5 or greater to increase LE stability during gait.  NOT MET  -Patient will increase her L cervical ROM from 11 degrees to 25 degrees to increase ease w/ ADLs like driving. NOT MET    Plan: discharge to independent program.            Timed:  Aquatic Therapy    45     mins 12733;    Cleve Armstrong, PT  Physical Therapist

## 2022-09-13 ENCOUNTER — TRANSCRIBE ORDERS (OUTPATIENT)
Dept: PHYSICAL THERAPY | Facility: CLINIC | Age: 57
End: 2022-09-13

## 2022-09-13 DIAGNOSIS — S33.5XXA LUMBAR BACK SPRAIN, INITIAL ENCOUNTER: Primary | ICD-10-CM

## 2023-08-01 ENCOUNTER — TREATMENT (OUTPATIENT)
Dept: PHYSICAL THERAPY | Facility: CLINIC | Age: 58
End: 2023-08-01
Payer: MEDICARE

## 2023-08-01 DIAGNOSIS — S33.5XXD LUMBAR SPRAIN, SUBSEQUENT ENCOUNTER: Primary | ICD-10-CM

## 2023-08-01 DIAGNOSIS — Z87.39 HISTORY OF LEFT FOOT DROP: ICD-10-CM

## 2023-08-01 DIAGNOSIS — M54.42 CHRONIC BILATERAL LOW BACK PAIN WITH LEFT-SIDED SCIATICA: ICD-10-CM

## 2023-08-01 DIAGNOSIS — G89.29 CHRONIC BILATERAL LOW BACK PAIN WITH LEFT-SIDED SCIATICA: ICD-10-CM

## 2023-08-01 PROCEDURE — 97162 PT EVAL MOD COMPLEX 30 MIN: CPT | Performed by: PHYSICAL THERAPIST

## 2023-08-01 PROCEDURE — 97530 THERAPEUTIC ACTIVITIES: CPT | Performed by: PHYSICAL THERAPIST

## 2023-08-02 ENCOUNTER — TREATMENT (OUTPATIENT)
Dept: PHYSICAL THERAPY | Facility: CLINIC | Age: 58
End: 2023-08-02
Payer: MEDICARE

## 2023-08-02 DIAGNOSIS — G89.29 CHRONIC BILATERAL LOW BACK PAIN WITH LEFT-SIDED SCIATICA: ICD-10-CM

## 2023-08-02 DIAGNOSIS — S33.5XXD LUMBAR SPRAIN, SUBSEQUENT ENCOUNTER: Primary | ICD-10-CM

## 2023-08-02 DIAGNOSIS — M54.42 CHRONIC BILATERAL LOW BACK PAIN WITH LEFT-SIDED SCIATICA: ICD-10-CM

## 2023-08-02 DIAGNOSIS — Z87.39 HISTORY OF LEFT FOOT DROP: ICD-10-CM

## 2023-08-04 ENCOUNTER — TREATMENT (OUTPATIENT)
Dept: PHYSICAL THERAPY | Facility: CLINIC | Age: 58
End: 2023-08-04
Payer: MEDICARE

## 2023-08-04 DIAGNOSIS — S33.5XXD LUMBAR SPRAIN, SUBSEQUENT ENCOUNTER: Primary | ICD-10-CM

## 2023-08-04 DIAGNOSIS — Z87.39 HISTORY OF LEFT FOOT DROP: ICD-10-CM

## 2023-08-04 DIAGNOSIS — M54.2 PAIN, NECK: ICD-10-CM

## 2023-08-04 DIAGNOSIS — G89.29 CHRONIC BILATERAL LOW BACK PAIN WITH LEFT-SIDED SCIATICA: ICD-10-CM

## 2023-08-04 DIAGNOSIS — M54.42 CHRONIC BILATERAL LOW BACK PAIN WITH LEFT-SIDED SCIATICA: ICD-10-CM

## 2023-08-07 ENCOUNTER — TELEPHONE (OUTPATIENT)
Dept: PHYSICAL THERAPY | Facility: CLINIC | Age: 58
End: 2023-08-07

## 2023-08-07 NOTE — TELEPHONE ENCOUNTER
Caller: No Lambert    Relationship: Self    What was the call regarding: PATIENT HAD TO CANCEL TODAY'S APPT DUE TO NOT FEELING WELL

## 2023-08-11 ENCOUNTER — TREATMENT (OUTPATIENT)
Dept: PHYSICAL THERAPY | Facility: CLINIC | Age: 58
End: 2023-08-11
Payer: MEDICARE

## 2023-08-11 DIAGNOSIS — Z87.39 HISTORY OF LEFT FOOT DROP: ICD-10-CM

## 2023-08-11 DIAGNOSIS — G89.29 CHRONIC BILATERAL LOW BACK PAIN WITH LEFT-SIDED SCIATICA: ICD-10-CM

## 2023-08-11 DIAGNOSIS — S33.5XXD LUMBAR SPRAIN, SUBSEQUENT ENCOUNTER: Primary | ICD-10-CM

## 2023-08-11 DIAGNOSIS — M54.42 CHRONIC BILATERAL LOW BACK PAIN WITH LEFT-SIDED SCIATICA: ICD-10-CM

## 2023-08-11 DIAGNOSIS — M54.2 PAIN, NECK: ICD-10-CM

## 2023-08-11 NOTE — PROGRESS NOTES
Physical Therapy Daily Treatment Note    [facility]  [address] [id number] [phone] (phone)  [phone] (fax)    Patient: No Lambert   : 1965  Diagnosis/ICD-10 Code:  Lumbar sprain, subsequent encounter [S33.5XXD]  Referring practitioner: Toni Mccormack MD  Date of Initial Visit: Type: THERAPY  Noted: 2023  Today's Date: 2023  Patient seen for 4 sessions             Subjective   I had food poisoning and had to cancel my last appt.    Objective     AQUATIC EXERCISE:  Water walking Forwards, Sideways and Backwards 2-3 laps each  Hamstring Stretch w/ Lg noodle under  20 sec X 2  Piriformis Stretch 20 sec X 2  BKTC Wall Crawl 20 sec X 3 (spread throughout session)  Decompression with UE support on foam noodle and Railing   Mini Squats --  Abdominals -  Pushdowns with Lg Noodle X 15  Hip Flexion/Extension 15 X ea  Hip Abduction 15 X each  Leg Press w/ large foam ring X 15  Rows (Alternating) w/ closed paddles X 15  Stir the Pot w/ closed paddles 10/10  Shoulder Abduction/Adduction w/ medium (white) foam dumbbells x 15  Bicycling Suspended on Noodle --       Assessment/Plan  No reports helping her brother move and recently had food poisoning both of which aggravated her back.  She was able to fully participate in aqua therapy today.           Timed:  Aquatic Therapy    25     mins 84100;    Cleve Armstrong, PT  Physical Therapist    [place] License:  [id number]

## 2023-08-18 ENCOUNTER — TREATMENT (OUTPATIENT)
Dept: PHYSICAL THERAPY | Facility: CLINIC | Age: 58
End: 2023-08-18
Payer: MEDICARE

## 2023-08-18 DIAGNOSIS — M54.2 PAIN, NECK: ICD-10-CM

## 2023-08-18 DIAGNOSIS — S33.5XXD LUMBAR SPRAIN, SUBSEQUENT ENCOUNTER: Primary | ICD-10-CM

## 2023-08-18 DIAGNOSIS — Z87.39 HISTORY OF LEFT FOOT DROP: ICD-10-CM

## 2023-08-18 DIAGNOSIS — G89.29 CHRONIC BILATERAL LOW BACK PAIN WITH LEFT-SIDED SCIATICA: ICD-10-CM

## 2023-08-18 DIAGNOSIS — M54.42 CHRONIC BILATERAL LOW BACK PAIN WITH LEFT-SIDED SCIATICA: ICD-10-CM

## 2023-08-18 NOTE — PROGRESS NOTES
Physical Therapy Daily Treatment Note    [facility]  [address] [place] [address]  [phone] (phone)  [phone] (fax)    Patient: No Lambert   : 1965  Diagnosis/ICD-10 Code:  Lumbar sprain, subsequent encounter [S33.5XXD]  Referring practitioner: Toni Mccormack MD  Date of Initial Visit: Type: THERAPY  Noted: 2023  Today's Date: 2023  Patient seen for 5 sessions             Subjective   I had a Wellness check up and I lost 32 pounds. Back pain rating 6-7/10. I've had trouble with my tailbone for years since I fell on it and found out it was deformed.    Objective     AQUATIC EXERCISE:  Water walking Forwards, Sideways and Backwards on own pre-treatment  Hamstring Stretch w/ Lg noodle under ankle 20 sec X 2  Quad Stretch w/ foam ring support 20 sec X 2  Piriformis Stretch Next*  BKTC Wall Crawl 20 sec X 3 (spread throughout session)  Decompression with UE support on foam noodle and Railing  --  Mini Squats --  B Toe/Heel Raises Next*  Abdominals -  Pushdowns with Lg Noodle X 15  Hip Flexion/Extension 15 X ea  Hip Abduction 15 X each  Leg Press w/ large foam ring X 15  Rows (Alternating) w/ closed paddles X 20  Stir the Pot w/ closed paddles --  Shoulder Abduction/Adduction w/ large (yellow) foam dumbbells x 15  Bicycling Suspended on Noodle --      Assessment/Plan  No reports having more pain on her right side that is not typical for her.  She is motivated to perform aquatic exercises and is comfortable with the current program.   Plan: Add Ankle DF/PF and piriformis stretch next session          Timed:  Aquatic Therapy    30     mins 60269;    Cleve Armstrong, PT  Physical Therapist    [place] License:  [id number]

## 2023-08-25 ENCOUNTER — TELEPHONE (OUTPATIENT)
Dept: PHYSICAL THERAPY | Facility: CLINIC | Age: 58
End: 2023-08-25

## 2023-08-28 ENCOUNTER — TREATMENT (OUTPATIENT)
Dept: PHYSICAL THERAPY | Facility: CLINIC | Age: 58
End: 2023-08-28
Payer: MEDICARE

## 2023-08-28 DIAGNOSIS — Z87.39 HISTORY OF LEFT FOOT DROP: ICD-10-CM

## 2023-08-28 DIAGNOSIS — G89.29 CHRONIC BILATERAL LOW BACK PAIN WITH LEFT-SIDED SCIATICA: ICD-10-CM

## 2023-08-28 DIAGNOSIS — M54.42 CHRONIC BILATERAL LOW BACK PAIN WITH LEFT-SIDED SCIATICA: ICD-10-CM

## 2023-08-28 DIAGNOSIS — S33.5XXD LUMBAR SPRAIN, SUBSEQUENT ENCOUNTER: Primary | ICD-10-CM

## 2023-08-28 DIAGNOSIS — M54.2 PAIN, NECK: ICD-10-CM

## 2023-08-28 NOTE — PROGRESS NOTES
Physical Therapy 30-Day Progress Note     [facility]  [address] [place] [address]  [phone] (phone)  [phone] (fax)    Patient: No Lambert   : 1965  Diagnosis/ICD-10 Code:  Lumbar sprain, subsequent encounter [S33.5XXD]  Referring practitioner: Toni Mccormack MD  Date of Initial Visit: Type: THERAPY  Noted: 2023  Today's Date: 2023  Patient seen for 6 sessions      Subjective:     Clinical Progress: unchanged  Home Program Compliance: N/A  Treatment has included:  aquatic therapy    Subjective 15-20% improvement in back pain. Had my pain pump refilled today by Pain Management.    Objective     MMT:  Hip Flexion  left 4-/5  Knee Extension left 3+/5  Knee Flexion left 3+/5   Ankle DF left 3+/5    Functional outcome score: Oswestry 60%    AQUATIC EXERCISE:  Water walking Forwards, Sideways and Backwards on own post-treatment  Hamstring Stretch w/ Lg noodle under ankle 20 sec X 2  Quad Stretch w/ foam ring support 20 sec X 2  Piriformis Stretch Next*  BKTC Wall Crawl 20 sec X 3 (spread throughout session)  Mini Squats 15X  B Toe/Heel Raises 12x  Abdominals -  Pushdowns with Solid Lg Noodle X 20  Hip Flexion/Extension 20 X ea  Hip Abduction 20 X each  Leg Press w/ large foam ring X 15  Rows (Alternating) w/ closed paddles X 20  Stir the Pot w/ closed paddles --  Shoulder Abduction/Adduction w/ closed paddles x 15      Assessment/Plan  No has attended 5 sessions of aquatic therapy for back pain.  She reports a 15-20% decrease in her pain and has met 2/3 short term goals.  No has weakness in the left LE, including foot drop. Her sleep is decreased to ~2 hours at a time due to disruptions from her pain. She is able to exercise in the aquatic environment without an increase in her pain and finds benefit from the treatment provided.  Recommend additional skilled physical therapy.     STG 1 Patient will perform aquatic therapy exercises  for lumbar , lower extremity , and core  ROM/flexibility, strength and conditioning without increased pain.  MET     STG 2 Patient will demonstrate good postural awareness with standing and walking in pool. MET     STG 3 Patient will report decreased pain > 25%. ONGOING     Recommendations: Continue as planned  Timeframe: 1 month  Prognosis to achieve goals: good    PT Signature: Cleve Armstrong, PT  [place] License: [id number]      Based upon review of the patient's progress and continued therapy plan, it is my medical opinion that No Lambert should continue physical therapy treatment at [facility]  [address] [place] [address] [address] [place] [address]  [phone].    Signature: __________________________________  Toni Mccormack MD  NPI: [id number]                                          Timed:  Aquatic therapy  735[address]    45   mins

## 2023-09-06 ENCOUNTER — TREATMENT (OUTPATIENT)
Dept: PHYSICAL THERAPY | Facility: CLINIC | Age: 58
End: 2023-09-06
Payer: MEDICARE

## 2023-09-06 DIAGNOSIS — G89.29 CHRONIC BILATERAL LOW BACK PAIN WITH LEFT-SIDED SCIATICA: ICD-10-CM

## 2023-09-06 DIAGNOSIS — Z87.39 HISTORY OF LEFT FOOT DROP: ICD-10-CM

## 2023-09-06 DIAGNOSIS — M54.42 CHRONIC BILATERAL LOW BACK PAIN WITH LEFT-SIDED SCIATICA: ICD-10-CM

## 2023-09-06 DIAGNOSIS — S33.5XXD LUMBAR SPRAIN, SUBSEQUENT ENCOUNTER: Primary | ICD-10-CM

## 2023-09-06 NOTE — PROGRESS NOTES
Physical Therapy Daily Treatment Note    [facility]  [address] [place] [address]  [phone] (phone)  [phone] (fax)    Patient: No Lambert   : 1965  Diagnosis/ICD-10 Code:  Lumbar sprain, subsequent encounter [S33.5XXD]  Referring practitioner: Toni Mccormack MD  Date of Initial Visit: Type: THERAPY  Noted: 2023  Today's Date: 2023  Patient seen for 7 sessions             Subjective   My back and left hip are hurting.  Also getting numbness/tingling in my hands at times. I need to go back to the neck surgeon.    Objective     AQUATIC EXERCISE:  Water walking Forwards, Sideways and Backwards on own post-treatment  Hamstring Stretch w/ Lg noodle under ankle 20 sec X 2  Quad Stretch w/ foam ring support 20 sec X 2 Deferred  Piriformis Stretch Next*  BKTC Wall Crawl 20 sec X 3 (spread throughout session)  Mini Squats 15X  Ankle DF 10X  Abdominals -  Pushdowns with Solid Lg Noodle X 20  Hip Flexion/Extension 20 X ea  Hip Abduction 20 X each  Leg Press w/ large foam ring 2 X 15  Rows (Alternating) w/ closed paddles X 25  Shoulder horiz Abd/Add w/ closed paddles X 10  Stir the Pot w/ closed paddles --  Shoulder Abduction/Adduction w/ medium (white) foam dumbbells x 15        Assessment/Plan   PT provided demonstration and cueing throughout session for abdominal activation and correct form/technique with the prescribed exercises.            Timed:  Aquatic Therapy    30     mins [id number];    Cleve Armstrong, PT  Physical Therapist    [place] License:  [id number]

## 2023-09-11 ENCOUNTER — TREATMENT (OUTPATIENT)
Dept: PHYSICAL THERAPY | Facility: CLINIC | Age: 58
End: 2023-09-11
Payer: MEDICARE

## 2023-09-11 DIAGNOSIS — M54.42 CHRONIC BILATERAL LOW BACK PAIN WITH LEFT-SIDED SCIATICA: ICD-10-CM

## 2023-09-11 DIAGNOSIS — G89.29 CHRONIC BILATERAL LOW BACK PAIN WITH LEFT-SIDED SCIATICA: ICD-10-CM

## 2023-09-11 DIAGNOSIS — S33.5XXD LUMBAR SPRAIN, SUBSEQUENT ENCOUNTER: Primary | ICD-10-CM

## 2023-09-11 DIAGNOSIS — Z87.39 HISTORY OF LEFT FOOT DROP: ICD-10-CM

## 2023-09-11 NOTE — PROGRESS NOTES
Physical Therapy Daily Treatment Note    [facility]  [address] [place] [address]  [phone] (phone)  [phone] (fax)    Patient: No Lambert   : 1965  Diagnosis/ICD-10 Code:  Lumbar sprain, subsequent encounter [S33.5XXD]  Referring practitioner: Toni Mccormack MD  Date of Initial Visit: Type: THERAPY  Noted: 2023  Today's Date: 2023  Patient seen for 8 sessions             Subjective   My left hip is hurting. I don't think I will finish all the therapy because I have a $35 co-pay and that is expensive.    Objective     AQUATIC EXERCISE:  Decompression on Noodle X 2 min.  Water walking Forwards, Sideways and Backwards on own post-treatment  Hamstring Stretch w/ Lg noodle under ankle 20 sec X 2  Quad Stretch w/ foam ring support 20 sec X 2 Deferred  Piriformis Stretch 20 sec X 2 each  BKTC Wall Crawl 20 sec X 3 (spread throughout session)  Mini Squats 15X  Ankle DF/PF 10X  Abdominals -  Pushdowns with Solid Lg Noodle X 20  Hip Flexion/Extension 20 X ea  Hip Abduction 20 X each  Leg Press w/ large foam ring 2 X 15  Rows (Alternating) w/ closed paddles X 20  Shoulder horiz Abd/Add w/ open paddles X 15  Shoulder Abduction/Adduction w/ medium (white) foam dumbbells x 15       Assessment/Plan  No is reporting left hip pain more so than back pain the last 2 treatments.  We added piriformis stretching.  She was able to complete all the strengthening exercises without problem.  She is considering discharge after her next visit due to high co payment.          Timed:  Aquatic Therapy    45     mins [id number];    Cleve Armstrong, PT  Physical Therapist    [place] License:  [id number]

## 2023-09-18 ENCOUNTER — TELEPHONE (OUTPATIENT)
Dept: PHYSICAL THERAPY | Facility: CLINIC | Age: 58
End: 2023-09-18

## 2023-09-18 NOTE — TELEPHONE ENCOUNTER
Caller: No Lambert    Relationship: Self    What was the call regarding: PATIENT WILL NOT BE AT TODAY'S APPT OR WEDS. HER SON PASSED AWAY

## 2023-10-04 ENCOUNTER — TREATMENT (OUTPATIENT)
Dept: PHYSICAL THERAPY | Facility: CLINIC | Age: 58
End: 2023-10-04
Payer: MEDICARE

## 2023-10-04 DIAGNOSIS — M54.42 CHRONIC BILATERAL LOW BACK PAIN WITH LEFT-SIDED SCIATICA: ICD-10-CM

## 2023-10-04 DIAGNOSIS — Z87.39 HISTORY OF LEFT FOOT DROP: ICD-10-CM

## 2023-10-04 DIAGNOSIS — G89.29 CHRONIC BILATERAL LOW BACK PAIN WITH LEFT-SIDED SCIATICA: ICD-10-CM

## 2023-10-04 DIAGNOSIS — S33.5XXD LUMBAR SPRAIN, SUBSEQUENT ENCOUNTER: Primary | ICD-10-CM

## 2023-10-04 NOTE — PROGRESS NOTES
Physical Therapy Daily Treatment Note/Discharge Note    [facility]  [address] [place] [address]  [phone] (phone)  [phone] (fax)    Patient: No Lambert   : 1965  Diagnosis/ICD-10 Code:  Lumbar sprain, subsequent encounter [S33.5XXD]  Referring practitioner: Toni Mccormack MD  Date of Initial Visit: Type: THERAPY  Noted: 2023  Today's Date: 10/4/2023  Patient seen for 9 sessions             Subjective   I've been hurting really bad lately (she lost her son unexpectedly mid September)  Back pain ratin/10 and left Hip pain 7/10    Objective     Oswestry score  72%    AQUATIC EXERCISE:  Decompression on Noodle X 2 min.  Water walking Forwards, Sideways and Backwards on own post-treatment  Hamstring Stretch w/ Lg noodle under ankle 20 sec X 2  Quad Stretch w/ foam ring support 20 sec X 2 Deferred  Piriformis Stretch 20 sec X 2 each  BKTC Wall Crawl 20 sec X 3 (spread throughout session)  Mini Squats 15X  Ankle DF/PF 10X  Abdominals -  Pushdowns with Solid Lg Noodle X 20  Hip Flexion/Extension 20 X ea  Hip Abduction 20 X each  Leg Press w/ large foam ring 2 X 10  Rows (Alternating) w/ closed paddles X 20  Shoulder horiz Abd/Add w/ open paddles X 15  Shoulder Abduction/Adduction w/ medium (white) foam dumbbells x 15    Assessment/Plan  No has been seen for 8 sessions of aquatic physical therapy for treatment of chronic back pain.  She cancelled her last 4 appointments due to the unexpected loss of her son.  She states her pain has increased.  She states she would like to be discharged from physical therapy due to the high co pay amount.    STG 1 Patient will perform aquatic therapy exercises for lumbar , lower extremity , and core  ROM/flexibility, strength and conditioning without increased pain.  MET     STG 2 Patient will demonstrate good postural awareness with standing and walking in pool. MET     STG 3 Patient will report  decreased pain > 25%. ONGOING       LTG 1 Patient will demonstrate an independent aquatic HEP for lumbar , lower extremity , and core  strength,  ROM/flexibility, conditioning and balance with community resources . MET     LTG 2 Patient will demonstrate increased core strength and balance with use of added resistive equipment. MET     LTG 3 Patient will report decreased functional disability on Modified Oswestry > 10. NOT MET         Timed:  Aquatic Therapy    40     mins [id number];    Cleve Armstrong, PT  Physical Therapist    [place] License:  [id number]

## (undated) DEVICE — GLV SURG TRIUMPH CLASSIC PF LTX 8.5 STRL

## (undated) DEVICE — PK CHST BRST 40

## (undated) DEVICE — ADHS SKIN DERMABOND TOP ADVANCED

## (undated) DEVICE — SYR LL 3CC

## (undated) DEVICE — SUT SILK 0 TIES 18IN SA66G

## (undated) DEVICE — ANTIBACTERIAL UNDYED BRAIDED (POLYGLACTIN 910), SYNTHETIC ABSORBABLE SUTURE: Brand: COATED VICRYL

## (undated) DEVICE — NDL HYPO PRECISIONGLIDE REG 25G 1 1/2

## (undated) DEVICE — NDL SPINE 22G 31/2IN BLK

## (undated) DEVICE — THE PATIENT THERAPY CONTROLLER (PTC) IS A HANDHELD TOUCHSCREEN DEVICE, WHICH ALLOWS A PATIENT TO INITIATE A PRE-CONFIGURED SUPPLEMENTAL BOLUS OF MEDICATION FROM THEIR IMPLANTED PROGRAMMABLE PUMP. THE PATIENT REQUESTS A BOLUS BY PRESSING THE RX BUTTON ON THE PTC AND THEN PLACING IT OVER THEIR IMPLANTED PUMP.  THE PATIENT THERAPY CONTROLLER COMMUNICATES WITH THE IMPLANTED PUMP INITIATING THE SUPPLEMENTAL BOLUS DELIVERY.  THE SETTINGS FOR THE BOLUS DELIVERY ARE PROGRAMMED INTO THE PATIENT THERAPY CONTROLLER BY A HEALTHCARE CERTIFIED PROFESSIONAL (HCP) USING THE CONFIGURATION DEVICE.: Brand: PROMETRA PATIENT THERAPY CONTROLLER

## (undated) DEVICE — SYR LUERLOK 20CC

## (undated) DEVICE — GLV SURG TRIUMPH CLASSIC PF LTX 8 STRL

## (undated) DEVICE — DRSNG WND GZ PAD BORDERED 4X8IN STRL

## (undated) DEVICE — DRP C/ARM 41X74IN

## (undated) DEVICE — SUT ETHIB 0 CT2 CR8 18IN CX27D

## (undated) DEVICE — APPL CHLORAPREP W/TINT 26ML ORNG

## (undated) DEVICE — GOWN,NON-REINFORCED,SIRUS,SET IN SLV,XXL: Brand: MEDLINE

## (undated) DEVICE — 3M™ IOBAN™ 2 ANTIMICROBIAL INCISE DRAPE 6650EZ: Brand: IOBAN™ 2

## (undated) DEVICE — DRSNG TELFA PAD NONADH STR 1S 3X4IN